# Patient Record
Sex: FEMALE | Race: WHITE | NOT HISPANIC OR LATINO | URBAN - METROPOLITAN AREA
[De-identification: names, ages, dates, MRNs, and addresses within clinical notes are randomized per-mention and may not be internally consistent; named-entity substitution may affect disease eponyms.]

---

## 2019-07-07 ENCOUNTER — EMERGENCY (EMERGENCY)
Facility: HOSPITAL | Age: 54
LOS: 1 days | Discharge: HOME | End: 2019-07-07
Attending: EMERGENCY MEDICINE | Admitting: EMERGENCY MEDICINE
Payer: COMMERCIAL

## 2019-07-07 VITALS
SYSTOLIC BLOOD PRESSURE: 137 MMHG | OXYGEN SATURATION: 97 % | RESPIRATION RATE: 16 BRPM | TEMPERATURE: 98 F | DIASTOLIC BLOOD PRESSURE: 96 MMHG | HEART RATE: 71 BPM

## 2019-07-07 VITALS
OXYGEN SATURATION: 98 % | DIASTOLIC BLOOD PRESSURE: 94 MMHG | HEART RATE: 94 BPM | RESPIRATION RATE: 16 BRPM | TEMPERATURE: 99 F | SYSTOLIC BLOOD PRESSURE: 152 MMHG | HEIGHT: 58 IN | WEIGHT: 128.09 LBS

## 2019-07-07 PROCEDURE — 73562 X-RAY EXAM OF KNEE 3: CPT

## 2019-07-07 PROCEDURE — 99283 EMERGENCY DEPT VISIT LOW MDM: CPT

## 2019-07-07 PROCEDURE — 73562 X-RAY EXAM OF KNEE 3: CPT | Mod: 26,RT

## 2019-07-07 PROCEDURE — 96372 THER/PROPH/DIAG INJ SC/IM: CPT

## 2019-07-07 PROCEDURE — 99284 EMERGENCY DEPT VISIT MOD MDM: CPT | Mod: 25

## 2019-07-07 RX ORDER — METOPROLOL TARTRATE 50 MG
1 TABLET ORAL
Qty: 0 | Refills: 0 | DISCHARGE

## 2019-07-07 RX ORDER — CLONAZEPAM 1 MG
1 TABLET ORAL
Qty: 0 | Refills: 0 | DISCHARGE

## 2019-07-07 RX ORDER — VORTIOXETINE 5 MG/1
0 TABLET, FILM COATED ORAL
Qty: 0 | Refills: 0 | DISCHARGE

## 2019-07-07 RX ORDER — AMLODIPINE BESYLATE 2.5 MG/1
1 TABLET ORAL
Qty: 0 | Refills: 0 | DISCHARGE

## 2019-07-07 RX ORDER — VENLAFAXINE HCL 75 MG
1 CAPSULE, EXT RELEASE 24 HR ORAL
Qty: 0 | Refills: 0 | DISCHARGE

## 2019-07-07 RX ORDER — LEVOTHYROXINE SODIUM 125 MCG
1 TABLET ORAL
Qty: 0 | Refills: 0 | DISCHARGE

## 2019-07-07 RX ORDER — TRAMADOL HYDROCHLORIDE 50 MG/1
1 TABLET ORAL
Qty: 9 | Refills: 0
Start: 2019-07-07 | End: 2019-07-09

## 2019-07-07 RX ORDER — KETOROLAC TROMETHAMINE 30 MG/ML
60 SYRINGE (ML) INJECTION ONCE
Refills: 0 | Status: DISCONTINUED | OUTPATIENT
Start: 2019-07-07 | End: 2019-07-07

## 2019-07-07 RX ADMIN — Medication 60 MILLIGRAM(S): at 13:31

## 2019-07-07 NOTE — ED ADULT NURSE NOTE - OBJECTIVE STATEMENT
Received pt in bed alert and oriented x4.  Patient states she was playing volley ball last night and another person collided with her and she heard a "crack" on her knee right side. Slight swelling noted to right side of right knee.   Pain reported 9/10. Ongoing nursing care and safety maintained. Received pt in bed alert and oriented x4.  Patient states she was playing volley ball last night and another person collided with her and she heard a "crack" on her knee right side. Slight swelling noted to right side of right knee.   Pain reported 9/10. X ray pending.  Ongoing nursing care and safety maintained.

## 2019-07-07 NOTE — ED ADULT NURSE NOTE - NSIMPLEMENTINTERV_GEN_ALL_ED
Implemented All Fall with Harm Risk Interventions:  [place] to call system. Call bell, personal items and telephone within reach. Instruct patient to call for assistance. Room bathroom lighting operational. Non-slip footwear when patient is off stretcher. Physically safe environment: no spills, clutter or unnecessary equipment. Stretcher in lowest position, wheels locked, appropriate side rails in place. Provide visual cue, wrist band, yellow gown, etc. Monitor gait and stability. Monitor for mental status changes and reorient to person, place, and time. Review medications for side effects contributing to fall risk. Reinforce activity limits and safety measures with patient and family. Provide visual clues: red socks.

## 2019-07-07 NOTE — ED PROVIDER NOTE - OBJECTIVE STATEMENT
53 y/o female presents to ED c/o right knee pain s/p trauma while playing volleyball last night. States she accidentally hit her knee into another player. Denies fall. Rates pain 8/10, worse with movement, no radiation of pain, sudden onset. Denies any other complaints. States she otherwise feels good. Denies n/v, f/c, chest pain, sob, numbness, tingling, recent traveling.

## 2019-07-07 NOTE — ED PROVIDER NOTE - ATTENDING CONTRIBUTION TO CARE
Pt is a 53 yo female who presents to the ED with a cc of right knee pain.  PMHx of HTN, hypothyroidism.  Pt reports that last night she was playing in a volleyball game.  She reports that she dove for a ball and another player crashed into her right knee.  She reports that she thought initially that she dislocated her patellar and that it spontaneously reduced.  She has been experiencing knee pain since with increased swelling.  Pain increased with weight bearing.  Denies numbness or tingling in ext.  Denies previous injury to knee.  On exam pt lying in bed NAD, heart RRR, lungs CTA, abd soft NT/ND.  Right knee: diffuse swelling noted anteriorly with TTP to upper lateral patellar region, FROM but reports pain on movement, sensation grossly intact, +pedal pulse.  Will obtain x-ray, high suspicion for ligament, meniscus injury advised on need for MRI outpatient.  Agree with above plan of care

## 2019-07-07 NOTE — ED ADULT NURSE NOTE - CAS EDN DISCHARGE ASSESSMENT
Alert and oriented to person, place and time/knee immobilizer in place and crutches given to patient. per MD orders

## 2019-07-07 NOTE — ED PROVIDER NOTE - CHPI ED SYMPTOMS NEG
no weakness/no tingling/no numbness/no back pain/no abrasion/no stiffness/no deformity/no bruising/no fever

## 2019-07-07 NOTE — ED PROVIDER NOTE - CLINICAL SUMMARY MEDICAL DECISION MAKING FREE TEXT BOX
Please follow up with your Primary MD in 24-48 hr. Please follow up with orthopedics Dr Gonzalez within 3 days- call tomorrow morning for an appointment. Keep knee immobilizer clean, dry and intact. Rest, ice, elevate extremity. OTC motrin every 6 hours as needed for pain, take with food. Percocet every 8 hours as needed for pain, do not drive or drink alcohol while taking this medication. Return to ED immediately if condition worsens or any concerns.  Seek immediate medical care for any new/worsening signs or symptoms. xray, pain management, reeval, f/u ortho  Please follow up with your Primary MD in 24-48 hr. Please follow up with orthopedics Dr Gonzalez within 3 days- call tomorrow morning for an appointment. Keep knee immobilizer clean, dry and intact. Rest, ice, elevate extremity. OTC motrin every 6 hours as needed for pain, take with food. Percocet every 8 hours as needed for pain, do not drive or drink alcohol while taking this medication. Return to ED immediately if condition worsens or any concerns.  Seek immediate medical care for any new/worsening signs or symptoms. xray, pain management, reeval, f/u ortho  Please follow up with your Primary MD in 24-48 hr. Please follow up with orthopedics Dr Gonzalez within 3 days- call tomorrow morning for an appointment. Keep knee immobilizer clean, dry and intact. Rest, ice, elevate extremity. OTC motrin every 6 hours as needed for pain, take with food. Return to ED immediately if condition worsens or any concerns.  Seek immediate medical care for any new/worsening signs or symptoms.

## 2020-04-08 PROBLEM — E03.9 HYPOTHYROIDISM, UNSPECIFIED: Chronic | Status: ACTIVE | Noted: 2019-07-07

## 2020-04-08 PROBLEM — I10 ESSENTIAL (PRIMARY) HYPERTENSION: Chronic | Status: ACTIVE | Noted: 2019-07-07

## 2020-04-13 ENCOUNTER — APPOINTMENT (OUTPATIENT)
Dept: ENDOCRINOLOGY | Facility: CLINIC | Age: 55
End: 2020-04-13
Payer: COMMERCIAL

## 2020-04-13 PROCEDURE — 99205 OFFICE O/P NEW HI 60 MIN: CPT | Mod: 95

## 2020-04-13 NOTE — REASON FOR VISIT
[Consultation] : a consultation visit [Hypothyroidism] : hypothyroidism [Thyroid nodule/ MNG] : thyroid nodule/ MNG [Pituitary Evaluation/ Disorder] : pituitary evaluation/disorder

## 2020-04-13 NOTE — CONSULT LETTER
[Dear  ___] : Dear  [unfilled], [Sincerely,] : Sincerely, [FreeTextEntry1] : Thank you for referring  Ms. PARESH FIGUEROA to me for evaluation and treatment. Please, see attached consultation note. As always, if there are specific questions you would like to discuss, please feel free to contact me.\par Thank you for the courtesy of this evaluation.\par  [FreeTextEntry3] : Fiona Conner MD, FACE, ECNU\par

## 2020-04-13 NOTE — ASSESSMENT
[Levothyroxine] : The patient was instructed to take Levothyroxine on an empty stomach, separate from vitamins, and wait at least 30 minutes before eating [FreeTextEntry1] : - repeat am cortisol, ACTH, E2, gonadotropins, fasting IGF-1 levels\par - advised to forward the report of pituitary MRI once it's available\par - in regards of hypothyroidism, she appears to be biochemically euthyroid and can continue present dose of L-thyroxine 150 mcg\par - I've advised to obtain the pathology report form her surgery, as well s repeating a thyroid US\par Proper intake of levothyroxine is reviewed in details, including on an empty stomach, with water only, at least one hour before taking any medications, vitamins, or supplements and three-four hours before taking iron or calcium.\par \par RTC 3-4 weeks (tele)

## 2020-04-13 NOTE — HISTORY OF PRESENT ILLNESS
[Home] : at home, [unfilled] , at the time of the visit. [Medical Office: ([facility])___] : at ~his/her~ medical office located in [place] [Spouse] : spouse [Patient] : the patient [Self] : self [FreeTextEntry1] : 55 year female  referred for pituitary evaluation, as well as the  management of thyroid nodules and hypothyroidism.\par Mrs Farfan was evaluated for a persistent headache lasting for about a month. She had 2 visits to ER and apparently CT scan of the head was negative. She was subsequently seen by Dr. Francisco and MRI of the brain was also negative. She's scheduled for an MRI of the pituitary gland today.  \par She's been also diagnosed with Hashimoto's thyroiditis and has been on a stable dose of L-thyroxine 150 mcg. She also underwent a partial left hemithyroidectomy about 4-5 years ago for a presumably precancerous lesion.\par Denies history of neck surgery or radiation exposure to the neck area other than radiologic studies. \par Family history is significant for undefined thyroid problems on her mother's side.\par Currently, there are no local neck symptoms of anterior neck pain or problems with breathing, speaking, or swallowing. \par Patient denies symptoms of hypothyroidism or hyperthyroidism. \par She's been also treated for uncontrolled HTN with Edarbyclor 40/12.5 and Clonidine prn. She reports an aldosterone checked in the past which was normal, and elevated renin which was attributed to her blood pressure medications. She's seen a nephrologist for that. \par She reports a history of breast cancer (P+, E+), s/p chemotherapy but no radiation therapy. She took Tamoxifen for 5 years. \par Labs: late pm cortisol 5.7, ACTH- 8.4, TSH-  1.64, FT4- 1.5, LH- 0.4, FSH- 15.1, GH- 0.08 (0.12-9.88) , IGF-1- 53, prolactin- 7.1\par \par MRI brain (3/24/20)- T2 hyperintensities in a pattern seen in association with vasospastic phenomena\par FNA (10/28/14) of the  LLP 1.0 cm thyroid nodule-  no path report is available\par Thyroid US (10/9/14)- LMP 0.9x0.5x0.7 echogenic nodule\par \par

## 2020-05-01 LAB
ACTH SER-ACNC: 7 PG/ML
CORTIS SERPL-MCNC: 5.2 UG/DL
ESTRADIOL SERPL-MCNC: 12 PG/ML
FSH SERPL-MCNC: 16.5 IU/L
GH SERPL-MCNC: 0.17 NG/ML
IGF-1 INTERP: NORMAL
IGF-I BLD-MCNC: 88 NG/ML
LH SERPL-ACNC: 1 IU/L
PROLACTIN SERPL-MCNC: 19.7 NG/ML
T4 FREE SERPL-MCNC: 1.7 NG/DL
THYROGLOB AB SERPL-ACNC: <20 IU/ML
THYROPEROXIDASE AB SERPL IA-ACNC: 599 IU/ML
TSH SERPL-ACNC: 0.11 UIU/ML

## 2020-05-04 LAB — ALPHA SUBUNIT SERPL-MCNC: 1.2 NG/ML

## 2020-06-18 LAB
ACTH SER-ACNC: <1.5 PG/ML
CORTIS SERPL-MCNC: 0.6 UG/DL

## 2020-08-24 LAB — CORTIS SERPL-MCNC: 10.2 UG/DL

## 2020-09-29 ENCOUNTER — APPOINTMENT (OUTPATIENT)
Dept: ENDOCRINOLOGY | Facility: CLINIC | Age: 55
End: 2020-09-29

## 2020-10-12 ENCOUNTER — APPOINTMENT (OUTPATIENT)
Dept: ENDOCRINOLOGY | Facility: CLINIC | Age: 55
End: 2020-10-12
Payer: COMMERCIAL

## 2020-10-12 VITALS
RESPIRATION RATE: 17 BRPM | HEIGHT: 58 IN | WEIGHT: 139 LBS | OXYGEN SATURATION: 98 % | SYSTOLIC BLOOD PRESSURE: 130 MMHG | DIASTOLIC BLOOD PRESSURE: 80 MMHG | TEMPERATURE: 98.7 F | HEART RATE: 75 BPM | BODY MASS INDEX: 29.18 KG/M2

## 2020-10-12 PROCEDURE — 99214 OFFICE O/P EST MOD 30 MIN: CPT | Mod: 25

## 2020-10-12 PROCEDURE — 36415 COLL VENOUS BLD VENIPUNCTURE: CPT

## 2020-10-12 NOTE — HISTORY OF PRESENT ILLNESS
[FreeTextEntry1] : 55 year female  f/u for pituitary evaluation, and  management of thyroid nodules and hypothyroidism.\par \par *** Oct 12, 2020 ***\par \par on L-thyroxine 137 mcg\par rpt am cortisol- 10.2  (once off steroids)\par \par prior to that , received 7 injections of triamcinolone\par Thyroid US (5/26/20)- s/p left hemithyroidectomy. no nodules in the right thyroid\par \par Pit MRI (4/13/20)- no pit masses\par \par HPI:\par Mrs Farfan was evaluated for a persistent headache lasting for about a month. She had 2 visits to ER and apparently CT scan of the head was negative. She was subsequently seen by Dr. Francisco and MRI of the brain was also negative. She's scheduled for an MRI of the pituitary gland today.  \par She's been also diagnosed with Hashimoto's thyroiditis and has been on a stable dose of L-thyroxine 150 mcg. She also underwent a partial left hemithyroidectomy about 4-5 years ago for a presumably precancerous lesion.\par Denies history of neck surgery or radiation exposure to the neck area other than radiologic studies. \par Family history is significant for undefined thyroid problems on her mother's side.\par Currently, there are no local neck symptoms of anterior neck pain or problems with breathing, speaking, or swallowing. \par Patient denies symptoms of hypothyroidism or hyperthyroidism. \par She's been also treated for uncontrolled HTN with Edarbyclor 40/12.5 and Clonidine prn. She reports an aldosterone checked in the past which was normal, and elevated renin which was attributed to her blood pressure medications. She's seen a nephrologist for that. \par She reports a history of breast cancer (P+, E+), s/p chemotherapy but no radiation therapy. She took Tamoxifen for 5 years. \par Labs: late pm cortisol 5.7, ACTH- 8.4, TSH-  1.64, FT4- 1.5, LH- 0.4, FSH- 15.1, GH- 0.08 (0.12-9.88) , IGF-1- 53, prolactin- 7.1\par \par MRI brain (3/24/20)- T2 hyperintensities in a pattern seen in association with vasospastic phenomena\par FNA (10/28/14) of the  LLP 1.0 cm thyroid nodule-  no path report is available\par Thyroid US (10/9/14)- LMP 0.9x0.5x0.7 echogenic nodule\par \par

## 2020-10-12 NOTE — ASSESSMENT
[Levothyroxine] : The patient was instructed to take Levothyroxine on an empty stomach, separate from vitamins, and wait at least 30 minutes before eating [FreeTextEntry1] : - no  pit masses seen on the last MRI\par - I've also discussed with the patient, that epidural triamcinolone supressed HPA axis within a week with gradual recovering over 4-12 wks. She seems to recover completely, and we'll reassess clinically\par - in regards of hypothyroidism, she appears to be clinically euthyroid and can continue present dose of L-thyroxine 137 mcg until the results a re back\par - I've advised to obtain the pathology report form her surgery. Will repeat thyroid US PRN\par Proper intake of levothyroxine is reviewed in details, including on an empty stomach, with water only, at least one hour before taking any medications, vitamins, or supplements and three-four hours before taking iron or calcium.\par \par RTC 3-6 mos

## 2020-10-12 NOTE — REASON FOR VISIT
[Follow - Up] : a follow-up visit [Hypothyroidism] : hypothyroidism [Pituitary Evaluation/ Disorder] : pituitary evaluation/disorder [Thyroid nodule/ MNG] : thyroid nodule/ MNG

## 2020-10-13 LAB
T4 FREE SERPL-MCNC: 1.2 NG/DL
TSH SERPL-ACNC: 0.98 UIU/ML

## 2021-01-14 ENCOUNTER — APPOINTMENT (OUTPATIENT)
Dept: ENDOCRINOLOGY | Facility: CLINIC | Age: 56
End: 2021-01-14
Payer: COMMERCIAL

## 2021-01-14 VITALS
DIASTOLIC BLOOD PRESSURE: 60 MMHG | WEIGHT: 131 LBS | BODY MASS INDEX: 27.5 KG/M2 | HEIGHT: 58 IN | OXYGEN SATURATION: 98 % | RESPIRATION RATE: 15 BRPM | SYSTOLIC BLOOD PRESSURE: 96 MMHG | TEMPERATURE: 98.5 F | HEART RATE: 86 BPM

## 2021-01-14 PROCEDURE — 99214 OFFICE O/P EST MOD 30 MIN: CPT

## 2021-01-14 PROCEDURE — 99072 ADDL SUPL MATRL&STAF TM PHE: CPT

## 2021-01-14 NOTE — ASSESSMENT
[Levothyroxine] : The patient was instructed to take Levothyroxine on an empty stomach, separate from vitamins, and wait at least 30 minutes before eating [FreeTextEntry1] : - no  pit masses seen on the last MRI\par - I've also discussed with the patient, that epidural triamcinolone suppressed HPA axis within a week with gradual recovering over 4-12 wks. She seems to recover completely, and we'll reassess clinically\par - in regards of hypothyroidism, she appears to be clinically euthyroid and can continue present dose of L-thyroxine 137 mcg until the results a re back\par - I've advised to obtain the pathology report form her surgery. Will repeat thyroid US PRN\par - screen for endocrine HTN\par If labs are fine, can f/u in 6 mos

## 2021-01-14 NOTE — HISTORY OF PRESENT ILLNESS
[FreeTextEntry1] : 55 year female  f/u for pituitary evaluation, and  management of thyroid nodules and hypothyroidism.\par \par *** Jan 14, 2021 ***\par \par seeing Dr. Francisco for migraines\par still with fatigue, thinning hair. admitted in December for hypertensive urgency with SBP > 220\par on L-thyroxine 137 mcg\par taking labetalol 200 mg bid, clonidine, hydralazine 50 gm bid. See. cardio at [facility]\par \par *** Oct 12, 2020 ***\par \par on L-thyroxine 137 mcg\par rpt am cortisol- 10.2  (once off steroids)\par \par prior to that , received 7 injections of triamcinolone\par Thyroid US (5/26/20)- s/p left hemithyroidectomy. no nodules in the right thyroid\par \par Pit MRI (4/13/20)- no pit masses\par \par HPI:\par Mrs Farfan was evaluated for a persistent headache lasting for about a month. She had 2 visits to ER and apparently CT scan of the head was negative. She was subsequently seen by Dr. Francisco and MRI of the brain was also negative. She's scheduled for an MRI of the pituitary gland today.  \par She's been also diagnosed with Hashimoto's thyroiditis and has been on a stable dose of L-thyroxine 150 mcg. She also underwent a partial left hemithyroidectomy about 4-5 years ago for a presumably precancerous lesion.\par Denies history of neck surgery or radiation exposure to the neck area other than radiologic studies. \par Family history is significant for undefined thyroid problems on her mother's side.\par Currently, there are no local neck symptoms of anterior neck pain or problems with breathing, speaking, or swallowing. \par Patient denies symptoms of hypothyroidism or hyperthyroidism. \par She's been also treated for uncontrolled HTN with Edarbyclor 40/12.5 and Clonidine prn. She reports an aldosterone checked in the past which was normal, and elevated renin which was attributed to her blood pressure medications. She's seen a nephrologist for that. \par She reports a history of breast cancer (P+, E+), s/p chemotherapy but no radiation therapy. She took Tamoxifen for 5 years. \par Labs: late pm cortisol 5.7, ACTH- 8.4, TSH-  1.64, FT4- 1.5, LH- 0.4, FSH- 15.1, GH- 0.08 (0.12-9.88) , IGF-1- 53, prolactin- 7.1\par \par MRI brain (3/24/20)- T2 hyperintensities in a pattern seen in association with vasospastic phenomena\par FNA (10/28/14) of the  LLP 1.0 cm thyroid nodule-  no path report is available\par Thyroid US (10/9/14)- LMP 0.9x0.5x0.7 echogenic nodule\par \par

## 2021-02-19 LAB
25(OH)D3 SERPL-MCNC: 39.4 NG/ML
ALDOSTERONE SERUM: 20.6 NG/DL
ANION GAP SERPL CALC-SCNC: 13 MMOL/L
BUN SERPL-MCNC: 14 MG/DL
CALCIUM SERPL-MCNC: 9.7 MG/DL
CHLORIDE SERPL-SCNC: 102 MMOL/L
CO2 SERPL-SCNC: 22 MMOL/L
CREAT SERPL-MCNC: 0.65 MG/DL
ESTIMATED AVERAGE GLUCOSE: 123 MG/DL
GLUCOSE SERPL-MCNC: 119 MG/DL
HBA1C MFR BLD HPLC: 5.9 %
POTASSIUM SERPL-SCNC: 4.1 MMOL/L
SODIUM SERPL-SCNC: 137 MMOL/L
T4 FREE SERPL-MCNC: 1.3 NG/DL
TSH SERPL-ACNC: 0.43 UIU/ML

## 2021-02-23 LAB
METANEPHRINE, PL: 20.2 PG/ML
NORMETANEPHRINE, PL: 78 PG/ML

## 2021-03-26 LAB — RENIN ACTIVITY, PLASMA: 0.86 NG/ML/HR

## 2021-04-16 LAB
CREAT 24H UR-MCNC: 0.7 G/24 H
CREAT ?TM UR-MCNC: 30 MG/DL
PROT ?TM UR-MCNC: 24 HR
SPECIMEN VOL 24H UR: 2350 ML

## 2021-05-03 ENCOUNTER — APPOINTMENT (OUTPATIENT)
Dept: ENDOCRINOLOGY | Facility: CLINIC | Age: 56
End: 2021-05-03
Payer: COMMERCIAL

## 2021-05-03 VITALS
SYSTOLIC BLOOD PRESSURE: 140 MMHG | DIASTOLIC BLOOD PRESSURE: 60 MMHG | WEIGHT: 132 LBS | TEMPERATURE: 98 F | BODY MASS INDEX: 27.71 KG/M2 | HEIGHT: 58 IN

## 2021-05-03 PROCEDURE — 99214 OFFICE O/P EST MOD 30 MIN: CPT

## 2021-05-03 NOTE — HISTORY OF PRESENT ILLNESS
[FreeTextEntry1] : 56 year female  f/u for pituitary evaluation, and  management of thyroid nodules and hypothyroidism.\par \par *** May 03, 2021 ***\par \par labs from 4/16/21- TSH- 0.1, FT4- 1.8\par 24h [employer]/ johan/meta- not run by the lab\par CT abd is not approved by insurance- awaiting confirmatory testing\par still on multiple antihypertensive meds with BP suboptimally controlled\par \par *** Jan 14, 2021 ***\par \par seeing Dr. Francisco for migraines\par still with fatigue, thinning hair. admitted in December for hypertensive urgency with SBP > 220\par on L-thyroxine 137 mcg\par taking labetalol 200 mg bid, clonidine, hydralazine 50 gm bid. See. cardio at [facility]\par \par *** Oct 12, 2020 ***\par \par on L-thyroxine 137 mcg\par rpt am cortisol- 10.2  (once off steroids)\par \par prior to that , received 7 injections of triamcinolone\par Thyroid US (5/26/20)- s/p left hemithyroidectomy. no nodules in the right thyroid\par \par Pit MRI (4/13/20)- no pit masses\par \par HPI:\par Mrs Farfan was evaluated for a persistent headache lasting for about a month. She had 2 visits to ER and apparently CT scan of the head was negative. She was subsequently seen by Dr. Francisco and MRI of the brain was also negative. She's scheduled for an MRI of the pituitary gland today.  \par She's been also diagnosed with Hashimoto's thyroiditis and has been on a stable dose of L-thyroxine 150 mcg. She also underwent a partial left hemithyroidectomy about 4-5 years ago for a presumably precancerous lesion.\par Denies history of neck surgery or radiation exposure to the neck area other than radiologic studies. \par Family history is significant for undefined thyroid problems on her mother's side.\par Currently, there are no local neck symptoms of anterior neck pain or problems with breathing, speaking, or swallowing. \par Patient denies symptoms of hypothyroidism or hyperthyroidism. \par She's been also treated for uncontrolled HTN with Edarbyclor 40/12.5 and Clonidine prn. She reports an aldosterone checked in the past which was normal, and elevated renin which was attributed to her blood pressure medications. She's seen a nephrologist for that. \par She reports a history of breast cancer (P+, E+), s/p chemotherapy but no radiation therapy. She took Tamoxifen for 5 years. \par Labs: late pm cortisol 5.7, ACTH- 8.4, TSH-  1.64, FT4- 1.5, LH- 0.4, FSH- 15.1, GH- 0.08 (0.12-9.88) , IGF-1- 53, prolactin- 7.1\par \par MRI brain (3/24/20)- T2 hyperintensities in a pattern seen in association with vasospastic phenomena\par FNA (10/28/14) of the  LLP 1.0 cm thyroid nodule-  no path report is available\par Thyroid US (10/9/14)- LMP 0.9x0.5x0.7 echogenic nodule\par \par

## 2021-05-03 NOTE — REASON FOR VISIT
[Follow - Up] : a follow-up visit [Hypothyroidism] : hypothyroidism [Pituitary Evaluation/ Disorder] : pituitary evaluation/disorder [Thyroid nodule/ MNG] : thyroid nodule/ MNG [Adrenal Evaluation/Adrenal Disorder] : adrenal evaluation/adrenal disorder

## 2021-05-03 NOTE — ASSESSMENT
[Levothyroxine] : The patient was instructed to take Levothyroxine on an empty stomach, separate from vitamins, and wait at least 30 minutes before eating [FreeTextEntry1] : - no  pit masses seen on the last MRI\par - I've also discussed with the patient, that epidural triamcinolone suppressed HPA axis within a week with gradual recovering over 4-12 wks. She seems to recover completely, and we'll reassess clinically\par - in regards of hypothyroidism, decr  L-thyroxine 125 mcg \par - I've advised to obtain the pathology report form her surgery. Will repeat thyroid US PRN\par - needs 24 hrs [employer]/johan/meta and reapply for CT adrenals\par RTC post labs

## 2021-05-23 LAB
CREAT 24H UR-MCNC: 0.6 G/24 H
CREAT 24H UR-MCNC: 0.6 G/24 H
CREAT ?TM UR-MCNC: 31 MG/DL
CREAT ?TM UR-MCNC: 31 MG/DL
PROT ?TM UR-MCNC: 24 HR
PROT ?TM UR-MCNC: 24 HR
SODIUM 24H UR-SCNC: 48 MMOL/L
SODIUM 24H UR-SRATE: 91 MMOL/24HR
SPECIMEN VOL 24H UR: 1900 ML
SPECIMEN VOL 24H UR: 1900 ML

## 2021-05-27 LAB
CORTIS 24H UR-MCNC: 24 H
CORTIS 24H UR-MRATE: 8.9 MCG/24 H
METANEPH 24H UR-MRATE: 91 MCG/24 H
SPECIMEN VOL 24H UR: 1900 ML

## 2021-06-10 LAB
ALDOST 24H UR-MCNC: <4.75 UG/24 HR
SPIRONOLACTONE UR-MCNC: <2.5 UG/L

## 2021-09-10 ENCOUNTER — TRANSCRIPTION ENCOUNTER (OUTPATIENT)
Age: 56
End: 2021-09-10

## 2021-10-09 ENCOUNTER — TRANSCRIPTION ENCOUNTER (OUTPATIENT)
Age: 56
End: 2021-10-09

## 2021-11-15 ENCOUNTER — TRANSCRIPTION ENCOUNTER (OUTPATIENT)
Age: 56
End: 2021-11-15

## 2021-12-13 ENCOUNTER — RX RENEWAL (OUTPATIENT)
Age: 56
End: 2021-12-13

## 2021-12-20 ENCOUNTER — APPOINTMENT (OUTPATIENT)
Dept: ENDOCRINOLOGY | Facility: CLINIC | Age: 56
End: 2021-12-20
Payer: COMMERCIAL

## 2021-12-20 PROCEDURE — 99443: CPT

## 2021-12-20 NOTE — REASON FOR VISIT
[Follow - Up] : a follow-up visit [Adrenal Evaluation/Adrenal Disorder] : adrenal evaluation/adrenal disorder [Hypothyroidism] : hypothyroidism [Pituitary Evaluation/ Disorder] : pituitary evaluation/disorder [Thyroid nodule/ MNG] : thyroid nodule/ MNG

## 2021-12-20 NOTE — HISTORY OF PRESENT ILLNESS
[Home] : at home, [unfilled] , at the time of the visit. [Medical Office: ([facility])___] : at the medical office located in  [Verbal consent obtained from patient] : the patient, [unfilled] [FreeTextEntry1] : 56 year female  f/u for pituitary evaluation, and  management of thyroid nodules and hypothyroidism.\par \par *** Phone visit  Dec 20, 2021 ***\par \par working full time now, missed f/u appts\par plans to see a rheum for her arthritis. BP is still high- to see cardio at [facility]. still on labetalol, losartan, hydralazine\par no recent labs\par still on L-thyroxine 125 mcg. \par \par CT abd (7/20/21)- normal adrenals\par \par *** May 03, 2021 ***\par \par labs from 4/16/21- TSH- 0.1, FT4- 1.8\par 24h [employer]/ johan/meta- not run by the lab\par CT abd is not approved by insurance- awaiting confirmatory testing\par still on multiple antihypertensive meds with BP suboptimally controlled\par \par *** Jan 14, 2021 ***\par \par seeing Dr. Francisco for migraines\par still with fatigue, thinning hair. admitted in December for hypertensive urgency with SBP > 220\par on L-thyroxine 137 mcg\par taking labetalol 200 mg bid, clonidine, hydralazine 50 gm bid. See. cardio at [facility]\par \par *** Oct 12, 2020 ***\par \par on L-thyroxine 137 mcg\par rpt am cortisol- 10.2  (once off steroids)\par \par prior to that , received 7 injections of triamcinolone\par Thyroid US (5/26/20)- s/p left hemithyroidectomy. no nodules in the right thyroid\par \par Pit MRI (4/13/20)- no pit masses\par \par HPI:\par Mrs Farfan was evaluated for a persistent headache lasting for about a month. She had 2 visits to ER and apparently CT scan of the head was negative. She was subsequently seen by Dr. Francisco and MRI of the brain was also negative. She's scheduled for an MRI of the pituitary gland today.  \par She's been also diagnosed with Hashimoto's thyroiditis and has been on a stable dose of L-thyroxine 150 mcg. She also underwent a partial left hemithyroidectomy about 4-5 years ago for a presumably precancerous lesion.\par Denies history of neck surgery or radiation exposure to the neck area other than radiologic studies. \par Family history is significant for undefined thyroid problems on her mother's side.\par Currently, there are no local neck symptoms of anterior neck pain or problems with breathing, speaking, or swallowing. \par Patient denies symptoms of hypothyroidism or hyperthyroidism. \par She's been also treated for uncontrolled HTN with Edarbyclor 40/12.5 and Clonidine prn. She reports an aldosterone checked in the past which was normal, and elevated renin which was attributed to her blood pressure medications. She's seen a nephrologist for that. \par She reports a history of breast cancer (P+, E+), s/p chemotherapy but no radiation therapy. She took Tamoxifen for 5 years. \par Labs: late pm cortisol 5.7, ACTH- 8.4, TSH-  1.64, FT4- 1.5, LH- 0.4, FSH- 15.1, GH- 0.08 (0.12-9.88) , IGF-1- 53, prolactin- 7.1\par \par MRI brain (3/24/20)- T2 hyperintensities in a pattern seen in association with vasospastic phenomena\par FNA (10/28/14) of the  LLP 1.0 cm thyroid nodule-  no path report is available\par Thyroid US (10/9/14)- LMP 0.9x0.5x0.7 echogenic nodule\par \par

## 2021-12-20 NOTE — ASSESSMENT
[Levothyroxine] : The patient was instructed to take Levothyroxine on an empty stomach, separate from vitamins, and wait at least 30 minutes before eating [FreeTextEntry1] : - no  pit masses seen on the last MRI\par - I've also discussed with the patient, that epidural triamcinolone suppressed HPA axis within a week with gradual recovering over 4-12 wks. She seems to recover completely, and we'll reassess clinically\par - in regards of hypothyroidism, cont L-thyroxine 125 mcg \par - I've advised to obtain the pathology report form her surgery. Will repeat thyroid US PRN\par - CT adrenals is neg for adenoma. retest PAC/PRA and consider a trial of aldactone\par RTC post labs

## 2022-04-21 NOTE — ED PROVIDER NOTE - PMH
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

## 2022-06-14 ENCOUNTER — APPOINTMENT (OUTPATIENT)
Dept: ENDOCRINOLOGY | Facility: CLINIC | Age: 57
End: 2022-06-14
Payer: COMMERCIAL

## 2022-06-14 PROCEDURE — 99214 OFFICE O/P EST MOD 30 MIN: CPT | Mod: 95

## 2022-06-14 NOTE — ASSESSMENT
[Levothyroxine] : The patient was instructed to take Levothyroxine on an empty stomach, separate from vitamins, and wait at least 30 minutes before eating [FreeTextEntry1] : - no  pit masses seen on the last MRI\par - I've also discussed with the patient, that epidural triamcinolone suppressed HPA axis within a week with gradual recovering over 4-12 wks. She seems to recover completely, and we'll reassess clinically\par - in regards of hypothyroidism, cont L-thyroxine 125 mcg , pending labs\par - check thyroid panel and adjust the meds if needed\par - I've advised to obtain the pathology report form her surgery. Will repeat thyroid US PRN\par - CT adrenals is neg for adenoma. retest PAC/PRA prn and consider a trial of aldactone if needed\par \par RTC post labs

## 2022-06-14 NOTE — HISTORY OF PRESENT ILLNESS
[Home] : at home, [unfilled] , at the time of the visit. [Medical Office: ([facility])___] : at the medical office located in  [Verbal consent obtained from patient] : the patient, [unfilled] [FreeTextEntry1] : 57 year female  f/u for pituitary evaluation, and  management of thyroid nodules and hypothyroidism.\par \par *** Televisit  Jun 14, 2022 ***\par \par rescheduled today's visit to a TEB b/o the car issues\par with a worsening fatigue, hair loss recently, cold intolerance. using CPAP for her MINDY\par no labs available to me, but per patient was seen by a cardiologist and "TSH was elevated"\par remains on L-thyroxine 125 mcg\par \par 24h meta/UFC- normal\par \par *** Phone visit  Dec 20, 2021 ***\par \par working full time now, missed f/u appts\par plans to see a rheum for her arthritis. BP is still high- to see cardio at [facility]. still on labetalol, losartan, hydralazine\par no recent labs\par still on L-thyroxine 125 mcg. \par \par CT abd (7/20/21)- normal adrenals\par \par *** May 03, 2021 ***\par \par labs from 4/16/21- TSH- 0.1, FT4- 1.8\par 24h UFC/ johan/meta- not run by the lab\par CT abd is not approved by insurance- awaiting confirmatory testing\par still on multiple antihypertensive meds with BP suboptimally controlled\par \par *** Jan 14, 2021 ***\par \par seeing Dr. Francisco for migraines\par still with fatigue, thinning hair. admitted in December for hypertensive urgency with SBP > 220\par on L-thyroxine 137 mcg\par taking labetalol 200 mg bid, clonidine, hydralazine 50 gm bid. See. cardio at [facility]\par \par *** Oct 12, 2020 ***\par \par on L-thyroxine 137 mcg\par rpt am cortisol- 10.2  (once off steroids)\par \par prior to that , received 7 injections of triamcinolone\par Thyroid US (5/26/20)- s/p left hemithyroidectomy. no nodules in the right thyroid\par \par Pit MRI (4/13/20)- no pit masses\par \par HPI:\par Mrs Farfan was evaluated for a persistent headache lasting for about a month. She had 2 visits to ER and apparently CT scan of the head was negative. She was subsequently seen by Dr. Francisco and MRI of the brain was also negative. She's scheduled for an MRI of the pituitary gland today.  \par She's been also diagnosed with Hashimoto's thyroiditis and has been on a stable dose of L-thyroxine 150 mcg. She also underwent a partial left hemithyroidectomy about 4-5 years ago for a presumably precancerous lesion.\par Denies history of neck surgery or radiation exposure to the neck area other than radiologic studies. \par Family history is significant for undefined thyroid problems on her mother's side.\par Currently, there are no local neck symptoms of anterior neck pain or problems with breathing, speaking, or swallowing. \par Patient denies symptoms of hypothyroidism or hyperthyroidism. \par She's been also treated for uncontrolled HTN with Edarbyclor 40/12.5 and Clonidine prn. She reports an aldosterone checked in the past which was normal, and elevated renin which was attributed to her blood pressure medications. She's seen a nephrologist for that. \par She reports a history of breast cancer (P+, E+), s/p chemotherapy but no radiation therapy. She took Tamoxifen for 5 years. \par Labs: late pm cortisol 5.7, ACTH- 8.4, TSH-  1.64, FT4- 1.5, LH- 0.4, FSH- 15.1, GH- 0.08 (0.12-9.88) , IGF-1- 53, prolactin- 7.1\par \par MRI brain (3/24/20)- T2 hyperintensities in a pattern seen in association with vasospastic phenomena\par FNA (10/28/14) of the  LLP 1.0 cm thyroid nodule-  no path report is available\par Thyroid US (10/9/14)- LMP 0.9x0.5x0.7 echogenic nodule\par \par

## 2022-07-08 ENCOUNTER — NON-APPOINTMENT (OUTPATIENT)
Age: 57
End: 2022-07-08

## 2022-07-08 LAB
25(OH)D3 SERPL-MCNC: 31.9 NG/ML
ALBUMIN SERPL ELPH-MCNC: 4.4 G/DL
ALDOSTERONE SERUM: 11 NG/DL
ALP BLD-CCNC: 157 U/L
ALT SERPL-CCNC: 133 U/L
ANION GAP SERPL CALC-SCNC: 13 MMOL/L
AST SERPL-CCNC: 139 U/L
BILIRUB SERPL-MCNC: 0.2 MG/DL
BUN SERPL-MCNC: 10 MG/DL
CALCIUM SERPL-MCNC: 9.4 MG/DL
CHLORIDE SERPL-SCNC: 105 MMOL/L
CHOLEST SERPL-MCNC: 170 MG/DL
CO2 SERPL-SCNC: 20 MMOL/L
CREAT SERPL-MCNC: 0.58 MG/DL
EGFR: 107 ML/MIN/1.73M2
ESTIMATED AVERAGE GLUCOSE: 126 MG/DL
GLUCOSE SERPL-MCNC: 110 MG/DL
HBA1C MFR BLD HPLC: 6 %
HDLC SERPL-MCNC: 44 MG/DL
LDLC SERPL CALC-MCNC: 90 MG/DL
NONHDLC SERPL-MCNC: 126 MG/DL
POTASSIUM SERPL-SCNC: 4.3 MMOL/L
PROT SERPL-MCNC: 7.6 G/DL
RENIN ACTIVITY, PLASMA: 3.27 NG/ML/HR
SODIUM SERPL-SCNC: 138 MMOL/L
T4 FREE SERPL-MCNC: 1.3 NG/DL
THYROGLOB AB SERPL-ACNC: 167 IU/ML
THYROPEROXIDASE AB SERPL IA-ACNC: 357 IU/ML
TRIGL SERPL-MCNC: 178 MG/DL
TSH SERPL-ACNC: 7.39 UIU/ML

## 2022-08-16 ENCOUNTER — NON-APPOINTMENT (OUTPATIENT)
Age: 57
End: 2022-08-16

## 2022-09-02 ENCOUNTER — APPOINTMENT (OUTPATIENT)
Dept: ENDOCRINOLOGY | Facility: CLINIC | Age: 57
End: 2022-09-02

## 2022-09-02 NOTE — HISTORY OF PRESENT ILLNESS
[FreeTextEntry1] : This is a 56 yo female with history of Hashimoto thyroiditis, s/p left hemithyroidectomy, breast cancer s/p chemo and completed 5 yrs of Tamoxifen, prediabetes who presents for evaluation today\par \par She has h/o left hemithroidectomy for presumed suspcious nodule, has h/o Hashimoto thyroiditis. Maternal side of family- thyroid\par SHe had televisit with endocrine in June 2022, worsening fatgieu, hair loss, cold intolerance, and noted by cardiologist her TSH was elevated was on levothyroxine 125mcg. Labs were repeated, TSH was 7.39 and patient was advised to increase brand Unithroid 137mcg daily. Also noted 24hr metanephinres, and urinary free cortisol were normal\par Also noted no pit masses noted on last MRI. CT adrenal negative for adenoma\par Last A1c was 6.0%\par \par Seen by miguel in July 2022, advised to hold Pravachol\par

## 2022-10-11 ENCOUNTER — APPOINTMENT (OUTPATIENT)
Dept: ORTHOPEDIC SURGERY | Facility: CLINIC | Age: 57
End: 2022-10-11

## 2023-02-28 ENCOUNTER — NON-APPOINTMENT (OUTPATIENT)
Age: 58
End: 2023-02-28

## 2023-05-31 ENCOUNTER — APPOINTMENT (OUTPATIENT)
Dept: ENDOCRINOLOGY | Facility: CLINIC | Age: 58
End: 2023-05-31

## 2023-06-12 ENCOUNTER — RX RENEWAL (OUTPATIENT)
Age: 58
End: 2023-06-12

## 2023-06-21 ENCOUNTER — APPOINTMENT (OUTPATIENT)
Dept: ENDOCRINOLOGY | Facility: CLINIC | Age: 58
End: 2023-06-21

## 2023-07-13 ENCOUNTER — NON-APPOINTMENT (OUTPATIENT)
Age: 58
End: 2023-07-13

## 2023-08-16 ENCOUNTER — APPOINTMENT (OUTPATIENT)
Dept: ENDOCRINOLOGY | Facility: CLINIC | Age: 58
End: 2023-08-16
Payer: COMMERCIAL

## 2023-08-16 VITALS
DIASTOLIC BLOOD PRESSURE: 60 MMHG | SYSTOLIC BLOOD PRESSURE: 122 MMHG | RESPIRATION RATE: 16 BRPM | BODY MASS INDEX: 30.23 KG/M2 | HEART RATE: 62 BPM | HEIGHT: 58 IN | OXYGEN SATURATION: 98 % | WEIGHT: 144 LBS | TEMPERATURE: 97.6 F

## 2023-08-16 DIAGNOSIS — E06.3 AUTOIMMUNE THYROIDITIS: ICD-10-CM

## 2023-08-16 DIAGNOSIS — E23.7 DISORDER OF PITUITARY GLAND, UNSPECIFIED: ICD-10-CM

## 2023-08-16 PROCEDURE — 36415 COLL VENOUS BLD VENIPUNCTURE: CPT

## 2023-08-16 PROCEDURE — 99214 OFFICE O/P EST MOD 30 MIN: CPT | Mod: 25

## 2023-08-16 NOTE — HISTORY OF PRESENT ILLNESS
[FreeTextEntry1] : 58 year female  f/u for multiple medical issues  *** Aug 16, 2023 *** missed f/u visits feeling more tired, constant headaches, trouble sleeping, "brain fog" and feeling anxious, constipation seeing neurologist, reports normal brain MRI. scheduled for botox injections last labs from 5/23- TSH- 7.49 saw cardiologist, who increased  L-thyroxine to 150 mcg about a month ago.  Seeing psychiatrist- on Effexor and Klonopine  *** Televisit  Jun 14, 2022 ***  rescheduled today's visit to a TEB b/o the car issues with a worsening fatigue, hair loss recently, cold intolerance. using CPAP for her MINDY no labs available to me, but per patient was seen by a cardiologist and "TSH was elevated" remains on L-thyroxine 125 mcg  24h meta/UFC- normal  *** Phone visit  Dec 20, 2021 ***  working full time now, missed f/u appts plans to see a rheum for her arthritis. BP is still high- to see cardio at [facility]. still on labetalol, losartan, hydralazine no recent labs still on L-thyroxine 125 mcg.   CT abd (7/20/21)- normal adrenals  *** May 03, 2021 ***  labs from 4/16/21- TSH- 0.1, FT4- 1.8 24h UFC/ johan/meta- not run by the lab CT abd is not approved by insurance- awaiting confirmatory testing still on multiple antihypertensive meds with BP suboptimally controlled  *** Jan 14, 2021 ***  seeing Dr. Francisco for migraines still with fatigue, thinning hair. admitted in December for hypertensive urgency with SBP > 220 on L-thyroxine 137 mcg taking labetalol 200 mg bid, clonidine, hydralazine 50 gm bid. See. cardio at [facility]  *** Oct 12, 2020 ***  on L-thyroxine 137 mcg rpt am cortisol- 10.2  (once off steroids)  prior to that , received 7 injections of triamcinolone Thyroid US (5/26/20)- s/p left hemithyroidectomy. no nodules in the right thyroid  Pit MRI (4/13/20)- no pit masses  HPI: Mrs Farfan was evaluated for a persistent headache lasting for about a month. She had 2 visits to ER and apparently CT scan of the head was negative. She was subsequently seen by Dr. Francisco and MRI of the brain was also negative. She's scheduled for an MRI of the pituitary gland today.   She's been also diagnosed with Hashimoto's thyroiditis and has been on a stable dose of L-thyroxine 150 mcg. She also underwent a partial left hemithyroidectomy about 4-5 years ago for a presumably precancerous lesion. Denies history of neck surgery or radiation exposure to the neck area other than radiologic studies.  Family history is significant for undefined thyroid problems on her mother's side. Currently, there are no local neck symptoms of anterior neck pain or problems with breathing, speaking, or swallowing.  Patient denies symptoms of hypothyroidism or hyperthyroidism.  She's been also treated for uncontrolled HTN with Edarbyclor 40/12.5 and Clonidine prn. She reports an aldosterone checked in the past which was normal, and elevated renin which was attributed to her blood pressure medications. She's seen a nephrologist for that.  She reports a history of breast cancer (P+, E+), s/p chemotherapy but no radiation therapy. She took Tamoxifen for 5 years.  Labs: late pm cortisol 5.7, ACTH- 8.4, TSH-  1.64, FT4- 1.5, LH- 0.4, FSH- 15.1, GH- 0.08 (0.12-9.88) , IGF-1- 53, prolactin- 7.1  MRI brain (3/24/20)- T2 hyperintensities in a pattern seen in association with vasospastic phenomena FNA (10/28/14) of the  LLP 1.0 cm thyroid nodule-  no path report is available Thyroid US (10/9/14)- LMP 0.9x0.5x0.7 echogenic nodule

## 2023-08-16 NOTE — ASSESSMENT
[Levothyroxine] : The patient was instructed to take Levothyroxine on an empty stomach, separate from vitamins, and wait at least 30 minutes before eating [FreeTextEntry1] : 1. H/o abnormal cortisol levels - no  pit masses seen on the last MRI - I've also discussed with the patient, that epidural triamcinolone suppressed HPA axis within a week with gradual recovering over 4-12 wks. She seems to recover completely, and we'll reassess clinically  2. Hypothyroidism - repeat thyroid leves today and adjust medication - cont L-thyroxine 150 mcg , pending labs - consider  adding Cytomel  (R+B reviewed) Proper intake of levothyroxine is reviewed in details, including on an empty stomach, with water only, at least one hour before taking any medications, vitamins, or supplements and three-four hours before taking iron or calcium. - I've advised to obtain the pathology report form her surgery. Will repeat thyroid US PRN - CT adrenals is neg for adenoma. retest PAC/PRA  and consider a trial of aldactone if needed  RTC 3 months

## 2023-08-21 ENCOUNTER — APPOINTMENT (OUTPATIENT)
Dept: FAMILY MEDICINE | Facility: CLINIC | Age: 58
End: 2023-08-21
Payer: COMMERCIAL

## 2023-08-21 VITALS
OXYGEN SATURATION: 99 % | TEMPERATURE: 97.9 F | WEIGHT: 142.25 LBS | DIASTOLIC BLOOD PRESSURE: 74 MMHG | HEIGHT: 58 IN | HEART RATE: 89 BPM | SYSTOLIC BLOOD PRESSURE: 122 MMHG | BODY MASS INDEX: 29.86 KG/M2

## 2023-08-21 DIAGNOSIS — I10 ESSENTIAL (PRIMARY) HYPERTENSION: ICD-10-CM

## 2023-08-21 DIAGNOSIS — I51.3 INTRACARDIAC THROMBOSIS, NOT ELSEWHERE CLASSIFIED: ICD-10-CM

## 2023-08-21 DIAGNOSIS — Z87.891 PERSONAL HISTORY OF NICOTINE DEPENDENCE: ICD-10-CM

## 2023-08-21 LAB
ALBUMIN SERPL ELPH-MCNC: 4.4 G/DL
ALDOSTERONE SERUM: 4.6 NG/DL
ALP BLD-CCNC: 119 U/L
ALT SERPL-CCNC: 57 U/L
ANION GAP SERPL CALC-SCNC: 14 MMOL/L
AST SERPL-CCNC: 51 U/L
BILIRUB SERPL-MCNC: 0.2 MG/DL
BUN SERPL-MCNC: 14 MG/DL
CALCIUM SERPL-MCNC: 10.1 MG/DL
CHLORIDE SERPL-SCNC: 101 MMOL/L
CO2 SERPL-SCNC: 24 MMOL/L
CREAT SERPL-MCNC: 0.65 MG/DL
EGFR: 103 ML/MIN/1.73M2
ESTIMATED AVERAGE GLUCOSE: 123 MG/DL
GLUCOSE SERPL-MCNC: 94 MG/DL
HBA1C MFR BLD HPLC: 5.9 %
POTASSIUM SERPL-SCNC: 4.1 MMOL/L
PROT SERPL-MCNC: 7.2 G/DL
RENIN ACTIVITY, PLASMA: 8.33 NG/ML/HR
SODIUM SERPL-SCNC: 139 MMOL/L
T3 SERPL-MCNC: 144 NG/DL
T4 FREE SERPL-MCNC: 1.6 NG/DL
THYROGLOB AB SERPL-ACNC: <20 IU/ML
THYROPEROXIDASE AB SERPL IA-ACNC: 243 IU/ML
TSH SERPL-ACNC: 2.06 UIU/ML

## 2023-08-21 PROCEDURE — 99386 PREV VISIT NEW AGE 40-64: CPT | Mod: 25

## 2023-08-21 RX ORDER — GABAPENTIN 300 MG/1
300 CAPSULE ORAL
Refills: 0 | Status: ACTIVE | COMMUNITY
Start: 2023-08-21

## 2023-08-21 RX ORDER — HYDRALAZINE HYDROCHLORIDE 100 MG/1
100 TABLET ORAL
Refills: 0 | Status: ACTIVE | COMMUNITY
Start: 2023-08-21

## 2023-08-21 RX ORDER — LOSARTAN POTASSIUM 100 MG/1
100 TABLET, FILM COATED ORAL
Qty: 30 | Refills: 1 | Status: ACTIVE | COMMUNITY
Start: 2023-08-21

## 2023-08-21 RX ORDER — HYDRALAZINE HYDROCHLORIDE 100 MG/1
100 TABLET ORAL
Refills: 0 | Status: DISCONTINUED | COMMUNITY
Start: 2023-08-21 | End: 2023-08-21

## 2023-08-21 RX ORDER — VENLAFAXINE HYDROCHLORIDE 150 MG/1
150 CAPSULE, EXTENDED RELEASE ORAL
Refills: 0 | Status: ACTIVE | COMMUNITY
Start: 2023-08-21

## 2023-08-21 RX ORDER — LIDOCAINE 5% 700 MG/1
5 PATCH TOPICAL
Qty: 14 | Refills: 0 | Status: ACTIVE | COMMUNITY
Start: 2023-08-21 | End: 1900-01-01

## 2023-08-21 RX ORDER — CYCLOBENZAPRINE HYDROCHLORIDE 5 MG/1
5 TABLET, FILM COATED ORAL
Qty: 14 | Refills: 0 | Status: ACTIVE | COMMUNITY
Start: 2023-08-21 | End: 1900-01-01

## 2023-08-21 RX ORDER — CLONIDINE 0.3 MG/24H
0.3 PATCH, EXTENDED RELEASE TRANSDERMAL
Qty: 1 | Refills: 0 | Status: ACTIVE | COMMUNITY
Start: 2023-08-21

## 2023-08-21 RX ORDER — LABETALOL HYDROCHLORIDE 200 MG/1
200 TABLET, FILM COATED ORAL
Refills: 0 | Status: ACTIVE | COMMUNITY
Start: 2023-08-21

## 2023-08-21 NOTE — PAST MEDICAL HISTORY
[Perimenopausal] : perimenopausal [Total Preg ___] : G[unfilled] [Live Births ___] : P[unfilled]  [Full Term ___] : Full Term: [unfilled] [Premature ___] : Premature: [unfilled] [Abortions ___] : Abortions:[unfilled]

## 2023-08-21 NOTE — REVIEW OF SYSTEMS
[Fatigue] : fatigue [Joint Pain] : joint pain [Joint Stiffness] : joint stiffness [Back Pain] : back pain [Anxiety] : anxiety [Depression] : depression [Negative] : Heme/Lymph

## 2023-08-22 ENCOUNTER — NON-APPOINTMENT (OUTPATIENT)
Age: 58
End: 2023-08-22

## 2023-08-22 LAB
ALBUMIN SERPL ELPH-MCNC: 4.6 G/DL
ALP BLD-CCNC: 119 U/L
ALT SERPL-CCNC: 46 U/L
ANION GAP SERPL CALC-SCNC: 12 MMOL/L
AST SERPL-CCNC: 35 U/L
BILIRUB SERPL-MCNC: 0.2 MG/DL
BUN SERPL-MCNC: 12 MG/DL
CALCIUM SERPL-MCNC: 9.7 MG/DL
CHLORIDE SERPL-SCNC: 101 MMOL/L
CHOLEST SERPL-MCNC: 182 MG/DL
CO2 SERPL-SCNC: 27 MMOL/L
CREAT SERPL-MCNC: 0.51 MG/DL
EGFR: 109 ML/MIN/1.73M2
GLUCOSE SERPL-MCNC: 95 MG/DL
HDLC SERPL-MCNC: 45 MG/DL
LDLC SERPL CALC-MCNC: 109 MG/DL
NONHDLC SERPL-MCNC: 137 MG/DL
POTASSIUM SERPL-SCNC: 4.3 MMOL/L
PROT SERPL-MCNC: 7.4 G/DL
SODIUM SERPL-SCNC: 139 MMOL/L
TRIGL SERPL-MCNC: 155 MG/DL

## 2023-08-22 NOTE — PLAN
[FreeTextEntry1] : 1. HCM  - routine blood work, lipid, cbc/cmp/UA   - a1c, tsh checked in 8/16/2023 prediabetic  - EKG provided from [facility] 3/2023 - NSR no ischemic changes   2. former smoker  - start low dose CT scan screening   3. HTN  - continue current meds  - BP at goal  - follows cardio at [place]  4. LBP  - likely 2/2 to herniated discs  - rx for flexiril x 7 days and lidoderm patch sent to pharmacy  - to see ortho for fu   5. migraines  - follows neurology, pain management  - on gabapentin  - currently waiting for approval for botox injections   6. history opioid dependence - on bupenorphone  - follow up addiction psychiatry

## 2023-08-22 NOTE — HEALTH RISK ASSESSMENT
[Good] : ~his/her~ current health as good [Fair] :  ~his/her~ mood as fair [No] : No [1] : 2) Feeling down, depressed, or hopeless for several days (1) [Patient reported PAP Smear was normal] : Patient reported PAP Smear was normal [Patient reported colonoscopy was normal] : Patient reported colonoscopy was normal [HIV test declined] : HIV test declined [Hepatitis C test declined] : Hepatitis C test declined [None] : None [# of Members in Household ___] :  household currently consist of [unfilled] member(s) [Retired] : retired [] :  [# Of Children ___] : has [unfilled] children [Sexually Active] : sexually active [Feels Safe at Home] : Feels safe at home [Fully functional (bathing, dressing, toileting, transferring, walking, feeding)] : Fully functional (bathing, dressing, toileting, transferring, walking, feeding) [Former] : Former [20 or more] : 20 or more [< 15 Years] : < 15 Years [de-identified] : unable due to back  [[id number]] : 2 [High Risk Behavior] : no high risk behavior [Reports changes in hearing] : Reports no changes in hearing [Reports changes in vision] : Reports no changes in vision [MammogramComments] : breast sonograms  [PapSmearDate] : 08/22 [ColonoscopyDate] : 12/22

## 2023-08-22 NOTE — PHYSICAL EXAM
[Well Nourished] : well nourished [Well Developed] : well developed [Well-Appearing] : well-appearing [Normal Sclera/Conjunctiva] : normal sclera/conjunctiva [PERRL] : pupils equal round and reactive to light [EOMI] : extraocular movements intact [Normal Outer Ear/Nose] : the outer ears and nose were normal in appearance [Normal Oropharynx] : the oropharynx was normal [No JVD] : no jugular venous distention [No Lymphadenopathy] : no lymphadenopathy [Supple] : supple [Thyroid Normal, No Nodules] : the thyroid was normal and there were no nodules present [No Respiratory Distress] : no respiratory distress  [No Accessory Muscle Use] : no accessory muscle use [Clear to Auscultation] : lungs were clear to auscultation bilaterally [Normal Rate] : normal rate  [Regular Rhythm] : with a regular rhythm [Normal S1, S2] : normal S1 and S2 [No Murmur] : no murmur heard [No Carotid Bruits] : no carotid bruits [No Abdominal Bruit] : a ~M bruit was not heard ~T in the abdomen [No Varicosities] : no varicosities [Pedal Pulses Present] : the pedal pulses are present [No Edema] : there was no peripheral edema [No Palpable Aorta] : no palpable aorta [No Extremity Clubbing/Cyanosis] : no extremity clubbing/cyanosis [Soft] : abdomen soft [Non Tender] : non-tender [Non-distended] : non-distended [No Masses] : no abdominal mass palpated [No HSM] : no HSM [Normal Bowel Sounds] : normal bowel sounds [Normal Posterior Cervical Nodes] : no posterior cervical lymphadenopathy [Normal Anterior Cervical Nodes] : no anterior cervical lymphadenopathy [No CVA Tenderness] : no CVA  tenderness [No Spinal Tenderness] : no spinal tenderness [No Joint Swelling] : no joint swelling [Grossly Normal Strength/Tone] : grossly normal strength/tone [No Rash] : no rash [Coordination Grossly Intact] : coordination grossly intact [No Focal Deficits] : no focal deficits [Normal Gait] : normal gait [Deep Tendon Reflexes (DTR)] : deep tendon reflexes were 2+ and symmetric [Normal Affect] : the affect was normal [Normal Insight/Judgement] : insight and judgment were intact [de-identified] : in pain  [de-identified] : + left sided hypertoniciticy Lower back, tender to palpation throughout

## 2023-08-22 NOTE — HISTORY OF PRESENT ILLNESS
[de-identified] : 59 yo F PMHx Hypothyroidism, Depression, PTSD, History Opioid Dependence, Migraines, Chronic LBP 2/2 herniated discs, presenting for NP - CPE. today she c/o back spasm left side, no trauma.   Patient has chronic back pain, 2/2 to herniated discs. Sees pain management and orthopedics.  has tried PT, cortisone shots and epidurals. she takes flexiril, lidocaine patches on/off with some relief. will see ortho for follow up.  Patient also with hx of Left ventricular clot found on TTE last year. was placed on eliquis until MARYURI which revealed resolution of the clot for which she was taken off eliquis.  Patient has long standing history of PTSD for which she became opioid dependent. was placed on bupenorphone and tapered by pain management is now on 2 mg QD. she sees psychiatry.  Has history of breast cancer s/p bilateral masectomy with implants. gets sonograms every year.  history of migraines, patient is being evaluated for botox injections. has had MR Brain/MRA cervical spine.   meds:  bupenorphine 2 mg QD for opioid dependence - taking extra 1/2 for back pain - psychiatry  dr spring miller - tMJ migraines and headaches. waitng pain management - botox needs for migraines  neurology's - [facility] - zomiq -  colonoscopy - last kamran kimball - 2022 normal

## 2023-08-28 DIAGNOSIS — D64.9 ANEMIA, UNSPECIFIED: ICD-10-CM

## 2023-08-29 ENCOUNTER — APPOINTMENT (OUTPATIENT)
Dept: ORTHOPEDIC SURGERY | Facility: CLINIC | Age: 58
End: 2023-08-29
Payer: COMMERCIAL

## 2023-08-29 ENCOUNTER — NON-APPOINTMENT (OUTPATIENT)
Age: 58
End: 2023-08-29

## 2023-08-29 VITALS — BODY MASS INDEX: 30.23 KG/M2 | WEIGHT: 144 LBS | HEIGHT: 58 IN

## 2023-08-29 DIAGNOSIS — M50.30 OTHER CERVICAL DISC DEGENERATION, UNSPECIFIED CERVICAL REGION: ICD-10-CM

## 2023-08-29 DIAGNOSIS — G89.29 CERVICALGIA: ICD-10-CM

## 2023-08-29 DIAGNOSIS — I10 ESSENTIAL (PRIMARY) HYPERTENSION: ICD-10-CM

## 2023-08-29 DIAGNOSIS — M54.2 CERVICALGIA: ICD-10-CM

## 2023-08-29 DIAGNOSIS — E78.00 PURE HYPERCHOLESTEROLEMIA, UNSPECIFIED: ICD-10-CM

## 2023-08-29 PROCEDURE — 72170 X-RAY EXAM OF PELVIS: CPT

## 2023-08-29 PROCEDURE — 72040 X-RAY EXAM NECK SPINE 2-3 VW: CPT

## 2023-08-29 PROCEDURE — 99204 OFFICE O/P NEW MOD 45 MIN: CPT

## 2023-08-29 PROCEDURE — 72100 X-RAY EXAM L-S SPINE 2/3 VWS: CPT

## 2023-08-29 RX ORDER — LIDOCAINE 5% 700 MG/1
5 PATCH TOPICAL
Qty: 30 | Refills: 2 | Status: ACTIVE | COMMUNITY
Start: 2023-08-29 | End: 1900-01-01

## 2023-08-29 RX ORDER — METHOCARBAMOL 750 MG/1
750 TABLET, FILM COATED ORAL 3 TIMES DAILY
Qty: 90 | Refills: 0 | Status: ACTIVE | COMMUNITY
Start: 2023-08-29 | End: 1900-01-01

## 2023-08-29 NOTE — REVIEW OF SYSTEMS
"[facility] ICU Progress Note      Admit Date: 2/24/2019  ICU Day: 1    Resident(s): John Jefferson   Attending: JERED MANLEY/ Dr. De Los Santos    Date & Time:   2/25/2019   1:57 PM       Patient ID:    Name:             Chantale Donato   YOB: 1981  Age:                 37 y.o.  female   MRN:               [id number]    Diagnosis:  Right vocal cord weakness secondary to upper respiratory tract infection  Recent influenza URI      HPI:  \"37-year-old female with no significant past medical history who presented with shortness of breath and stridor.  Patient states 3 weeks ago she had a flu with symptoms of chills. fever and a cough.She tested influenza positive after she went to an ER.  She was given Tamiflu and Z-Ruiz.  She stated the symptoms resolved but she still had voice hoarseness.  About a week and half ago she went to her PCP because of persistent hoarseness.  She was given Z-Ruiz, medro dosepack, Tessalon Perles and over-the-counter cough suppressants which she states did not give relief.  She continued to have worsening hoarseness of her voice with shortness of breath shortness of breath and wheezing.  She denied any fever, chills, sore throat, chest pain, palpitations.     In the ED, she was afebrile, blood pressure was stable, she was bit tachycardic, ,RR 20.  Labs were significant for a lactate of 2.3, potassium 3.4,.  Chest x-ray did not show any acute pulmonary abnormality.  She received 1 dose of ceftriaxone 2 g and racemic Epi.  ENT was consulted in the ED, who noted that patient had right vocal cord weakness and recommended a 3-week tapering dose of oral steroids.  Patient is being admitted to the ICU for close monitoring of upper airway.\"     Consultants:  ENT   PMA    Interval Events:    -Expiratory stridor noted however improving compared to admission as per the patient.  -Completed IV methylprednisolone.  Start 3-week prednisone taper with Pepcid  -Robitussin for anti-tussive " effect  -Stable to be transferred to the medical floor    Physical Exam:  GEN: Well developed, AO x 4, in no apparent distress   HEENT: Atraumatic, normocephalic, oral mucosa is moist, no JVD, no cervical LAD  No pharyngeal erythema.  Upper airway stridor audible  CVS: S1 S2 present, R/R/R, no M/R/G  RS: Air entry equal bilaterally. No W/R/R noted upon auscultation  ABD: Soft, nontender, BS present in all quadrants  EXT: No pedal edema noted  NEUR: Patient is able to move all of her extremities. CN 2-12 are grossly intact         Respiratory:     Respiration: (!) 35, Pulse Oximetry: 98 %, O2 Daily Delivery Respiratory : Room Air with O2 Available    Chest Tube Drains:          HemoDynamics:  Pulse: 85, Heart Rate (Monitored): 83 Blood Pressure: 138/92, NIBP: 129/74      Neuro:      Fluids:    Date 19 0700 - 19 0659   Shift 8964-6920 6877-2103 4650-7774 24 Hour Total   I  N  T  A  K  E   P.O. 740   740      P.O. 740   740    Shift Total 740   740   O  U  T  P  U  T   Urine 0   0      Number of Times Voided 0 x   0 x      Urine Void (mL) 0   0    Shift Total 0   0      740        Intake/Output Summary (Last 24 hours) at 19 1357  Last data filed at 19 1000   Gross per 24 hour   Intake              940 ml   Output             1450 ml   Net             -510 ml       Weight: 92.7 kg (204 lb 5.9 oz)  Body mass index is 30.18 kg/m².    Recent Labs      19   1640  19   0549   SODIUM  134*  136   POTASSIUM  3.4*  3.9   CHLORIDE  107  110   CO2  21  21   BUN  15  17   CREATININE  0.75  0.56   CALCIUM  9.5  9.5       GI/Nutrition:  Recent Labs      19   1640  19   0549   ALTSGPT  13   --    ASTSGOT  15   --    ALKPHOSPHAT  65   --    TBILIRUBIN  0.3   --    GLUCOSE  181*  132*       Heme:  Recent Labs      19   1640  19   0549   RBC  4.42  4.33   HEMOGLOBIN  13.3  13.0   HEMATOCRIT  40.0  38.9   PLATELETCT  265  247       Infectious Disease:  Temp  Av.5 °C  (97.7 °F)  Min: 36.1 °C (97 °F)  Max: 36.9 °C (98.4 °F)  Recent Labs      02/24/19   1640  02/25/19   0549   WBC  7.8  10.1   NEUTSPOLYS  92.60*  90.40*   LYMPHOCYTES  5.90*  7.10*   MONOCYTES  1.20  1.80   EOSINOPHILS  0.00  0.10   BASOPHILS  0.00  0.10   ASTSGOT  15   --    ALTSGPT  13   --    ALKPHOSPHAT  65   --    TBILIRUBIN  0.3   --        Meds:  • famotidine  20 mg     • senna-docusate  2 Tab      And   • polyethylene glycol/lytes  1 Packet      And   • magnesium hydroxide  30 mL      And   • bisacodyl  10 mg     • Respiratory Care per Protocol       • enoxaparin  40 mg     • enalaprilat  1.25 mg     • guaiFENesin dextromethorphan  10 mL     • acetaminophen  650 mg     • predniSONE  40 mg      Followed by   • [START ON 3/4/2019] predniSONE  20 mg      Followed by   • [START ON 3/11/2019] predniSONE  10 mg          Problem and Plan:        Hypokalemia  - monitor Replete potassium    Stridor  Patient presented with Cough, shortness of breath, wheeze and stridor following an upper respiratory tract infection.  Received Z-Ruiz and Medrol Dosepak outpatient but worsened stridor was noted upon admission.  Vitals were stable on presentation.  Chest x-ray negative for any pulmonary abnormality.  On examination, she was noted to have upper airway stridor.  ENT, evaluated patient in the ED and noted a right vocal cord  weakness post upper respiratory tract infection.  Recommendations include steroid taper, and tight use of medications, and voice restrictions    -IV methylprednisolone for 24 hours received  -PO prednisone 40 mg x 7days, 20 mg x 7 days then 10mg x 7days per ENT recommendations.  -Pepcid started  -Robitussin cough syrup.  -Aspiration precautions.  -Okay to transfer to the medical floor          DISPO: Transfer to the medical floor    CODE STATUS: Full code    Quality Measures:  Stacy Catheter: None needed  DVT Prophylaxis: Subcutaneous Lovenox  Ulcer Prophylaxis: Pepcid  Antibiotics: None needed  Lines:  None    Procedures:  Laryngoscopy on 2/24/2019    Imaging:  DX-CHEST-PORTABLE (1 VIEW)   Final Result      No acute cardiac or pulmonary abnormalities are identified.      CT-FOREIGN FILM CAT SCAN   Final Result      OUTSIDE IMAGES-DX CHEST   Final Result               [Joint Pain] : joint pain [Negative] : Heme/Lymph

## 2023-08-29 NOTE — DATA REVIEWED
[MRI] : MRI [Cervical Spine] : cervical spine [I reviewed the films/CD and agree] : I reviewed the films/CD and agree [FreeTextEntry1] : straightening of lordosis, disc bulges, no stenosis

## 2023-08-29 NOTE — DISCUSSION/SUMMARY
[de-identified] : Needs MRI lumbar to evaluate for stenosis. PT ordered Methocarbamol and Lidoderm patch prescribed

## 2023-08-29 NOTE — HISTORY OF PRESENT ILLNESS
[Neck] : neck [Lower back] : lower back [Gradual] : gradual [10] : 10 [Dull/Aching] : dull/aching [Radiating] : radiating [Tightness] : tightness [Squeezing] : squeezing [Massage] : massage [Standing] : standing [Walking] : walking [Bending forward] : bending forward [Extending back] : extending back [Exercising] : exercising [Stairs] : stairs [de-identified] : Has long standing Low back pain radiating to hips. Has had Pain Management many years ago for LESIs. Not getting sciatica. Does home stretching, Lidoderm patches, ice/heat. Also neck pain with headaches and TMJ. No trauma [] : no [FreeTextEntry5] : 58yr old female neck and lower back had worsen since six months ago. Denies prior treatment. Denies specific injury/trauma. Received epidurals [FreeTextEntry7] : b/l hips [FreeTextEntry9] : stretching

## 2023-08-29 NOTE — PHYSICAL EXAM
[Extension] : extension [Straightening consistent with spasm] : Straightening consistent with spasm [] : negative sitting straight leg raise [Disc space narrowing] : Disc space narrowing [AP] : anteroposterior [There are no fractures, subluxations or dislocations. No significant abnormalities are seen] : There are no fractures, subluxations or dislocations. No significant abnormalities are seen

## 2023-09-02 ENCOUNTER — TRANSCRIPTION ENCOUNTER (OUTPATIENT)
Age: 58
End: 2023-09-02

## 2023-09-05 ENCOUNTER — APPOINTMENT (OUTPATIENT)
Dept: MRI IMAGING | Facility: CLINIC | Age: 58
End: 2023-09-05

## 2023-09-05 RX ORDER — DICLOFENAC SODIUM 75 MG/1
75 TABLET, DELAYED RELEASE ORAL
Qty: 60 | Refills: 2 | Status: ACTIVE | COMMUNITY
Start: 2023-09-05 | End: 1900-01-01

## 2023-09-12 ENCOUNTER — APPOINTMENT (OUTPATIENT)
Dept: ORTHOPEDIC SURGERY | Facility: CLINIC | Age: 58
End: 2023-09-12

## 2023-09-15 ENCOUNTER — APPOINTMENT (OUTPATIENT)
Dept: MRI IMAGING | Facility: CLINIC | Age: 58
End: 2023-09-15

## 2023-09-16 ENCOUNTER — APPOINTMENT (OUTPATIENT)
Dept: MRI IMAGING | Facility: CLINIC | Age: 58
End: 2023-09-16

## 2023-09-21 ENCOUNTER — APPOINTMENT (OUTPATIENT)
Dept: ORTHOPEDIC SURGERY | Facility: CLINIC | Age: 58
End: 2023-09-21

## 2023-09-27 ENCOUNTER — APPOINTMENT (OUTPATIENT)
Dept: MRI IMAGING | Facility: CLINIC | Age: 58
End: 2023-09-27
Payer: COMMERCIAL

## 2023-09-27 PROCEDURE — 72148 MRI LUMBAR SPINE W/O DYE: CPT

## 2023-09-29 ENCOUNTER — APPOINTMENT (OUTPATIENT)
Dept: ORTHOPEDIC SURGERY | Facility: CLINIC | Age: 58
End: 2023-09-29

## 2023-10-06 ENCOUNTER — APPOINTMENT (OUTPATIENT)
Dept: ORTHOPEDIC SURGERY | Facility: CLINIC | Age: 58
End: 2023-10-06
Payer: COMMERCIAL

## 2023-10-06 VITALS — HEIGHT: 58 IN | BODY MASS INDEX: 30.23 KG/M2 | WEIGHT: 144 LBS

## 2023-10-06 DIAGNOSIS — M54.50 LOW BACK PAIN, UNSPECIFIED: ICD-10-CM

## 2023-10-06 DIAGNOSIS — M51.36 OTHER INTERVERTEBRAL DISC DEGENERATION, LUMBAR REGION: ICD-10-CM

## 2023-10-06 DIAGNOSIS — M48.061 SPINAL STENOSIS, LUMBAR REGION WITHOUT NEUROGENIC CLAUDICATION: ICD-10-CM

## 2023-10-06 PROCEDURE — 20552 NJX 1/MLT TRIGGER POINT 1/2: CPT

## 2023-10-06 PROCEDURE — 99213 OFFICE O/P EST LOW 20 MIN: CPT | Mod: 25

## 2023-10-06 RX ORDER — KETOROLAC TROMETHAMINE 10 MG/1
10 TABLET, FILM COATED ORAL 3 TIMES DAILY
Qty: 20 | Refills: 0 | Status: ACTIVE | COMMUNITY
Start: 2023-10-06 | End: 1900-01-01

## 2023-10-09 ENCOUNTER — APPOINTMENT (OUTPATIENT)
Dept: FAMILY MEDICINE | Facility: CLINIC | Age: 58
End: 2023-10-09

## 2023-11-06 ENCOUNTER — APPOINTMENT (OUTPATIENT)
Dept: FAMILY MEDICINE | Facility: CLINIC | Age: 58
End: 2023-11-06

## 2023-11-08 DIAGNOSIS — D72.829 ELEVATED WHITE BLOOD CELL COUNT, UNSPECIFIED: ICD-10-CM

## 2023-11-10 ENCOUNTER — APPOINTMENT (OUTPATIENT)
Dept: PAIN MANAGEMENT | Facility: CLINIC | Age: 58
End: 2023-11-10

## 2023-11-13 ENCOUNTER — APPOINTMENT (OUTPATIENT)
Dept: FAMILY MEDICINE | Facility: CLINIC | Age: 58
End: 2023-11-13

## 2023-11-24 ENCOUNTER — APPOINTMENT (OUTPATIENT)
Dept: PAIN MANAGEMENT | Facility: CLINIC | Age: 58
End: 2023-11-24

## 2023-11-27 ENCOUNTER — TRANSCRIPTION ENCOUNTER (OUTPATIENT)
Age: 58
End: 2023-11-27

## 2023-11-27 LAB
BASOPHILS # BLD AUTO: 0.04 K/UL
BASOPHILS NFR BLD AUTO: 0.5 %
EOSINOPHIL # BLD AUTO: 0.2 K/UL
EOSINOPHIL NFR BLD AUTO: 2.5 %
HCT VFR BLD CALC: 36.5 %
HGB BLD-MCNC: 11 G/DL
IMM GRANULOCYTES NFR BLD AUTO: 0.5 %
LYMPHOCYTES # BLD AUTO: 3.1 K/UL
LYMPHOCYTES NFR BLD AUTO: 38 %
MAN DIFF?: NORMAL
MCHC RBC-ENTMCNC: 25 PG
MCHC RBC-ENTMCNC: 30.1 GM/DL
MCV RBC AUTO: 83 FL
MONOCYTES # BLD AUTO: 0.63 K/UL
MONOCYTES NFR BLD AUTO: 7.7 %
NEUTROPHILS # BLD AUTO: 4.15 K/UL
NEUTROPHILS NFR BLD AUTO: 50.8 %
PLATELET # BLD AUTO: 317 K/UL
RBC # BLD: 4.4 M/UL
RBC # FLD: 14.5 %
WBC # FLD AUTO: 8.16 K/UL

## 2023-11-28 LAB
ALBUMIN SERPL ELPH-MCNC: 4.4 G/DL
ALP BLD-CCNC: 126 U/L
ALT SERPL-CCNC: 41 U/L
ANION GAP SERPL CALC-SCNC: 13 MMOL/L
AST SERPL-CCNC: 35 U/L
BILIRUB SERPL-MCNC: 0.2 MG/DL
BUN SERPL-MCNC: 11 MG/DL
CALCIUM SERPL-MCNC: 10.9 MG/DL
CHLORIDE SERPL-SCNC: 99 MMOL/L
CHOLEST SERPL-MCNC: 235 MG/DL
CO2 SERPL-SCNC: 24 MMOL/L
CREAT SERPL-MCNC: 0.61 MG/DL
EGFR: 105 ML/MIN/1.73M2
ESTIMATED AVERAGE GLUCOSE: 126 MG/DL
GLUCOSE SERPL-MCNC: 114 MG/DL
HBA1C MFR BLD HPLC: 6 %
HDLC SERPL-MCNC: 60 MG/DL
LDLC SERPL CALC-MCNC: 152 MG/DL
NONHDLC SERPL-MCNC: 175 MG/DL
POTASSIUM SERPL-SCNC: 4.2 MMOL/L
PROT SERPL-MCNC: 7.2 G/DL
SODIUM SERPL-SCNC: 135 MMOL/L
TRIGL SERPL-MCNC: 131 MG/DL

## 2023-12-11 ENCOUNTER — RX RENEWAL (OUTPATIENT)
Age: 58
End: 2023-12-11

## 2023-12-11 RX ORDER — LEVOTHYROXINE SODIUM 0.15 MG/1
150 TABLET ORAL
Qty: 90 | Refills: 2 | Status: ACTIVE | COMMUNITY
Start: 2023-06-12 | End: 1900-01-01

## 2024-01-05 NOTE — ED PROVIDER NOTE - CPE EDP EYES NORM
Medication: pantoprazole (PROTONIX) 40 MG table   Last office visit date: 3/29/2023  Next appointment scheduled?: No;  pt needs a follow up appt    Number of refills given: 0    Encounter routed to PSR to schedule appt. Rx has an end date of 3/28/2024.     normal...

## 2024-05-06 ENCOUNTER — APPOINTMENT (OUTPATIENT)
Dept: FAMILY MEDICINE | Facility: CLINIC | Age: 59
End: 2024-05-06

## 2024-05-29 ENCOUNTER — APPOINTMENT (OUTPATIENT)
Dept: ENDOCRINOLOGY | Facility: CLINIC | Age: 59
End: 2024-05-29
Payer: COMMERCIAL

## 2024-05-29 VITALS
DIASTOLIC BLOOD PRESSURE: 76 MMHG | HEART RATE: 109 BPM | OXYGEN SATURATION: 97 % | HEIGHT: 58 IN | SYSTOLIC BLOOD PRESSURE: 115 MMHG | BODY MASS INDEX: 30.23 KG/M2 | WEIGHT: 144 LBS

## 2024-05-29 DIAGNOSIS — E78.5 HYPERLIPIDEMIA, UNSPECIFIED: ICD-10-CM

## 2024-05-29 DIAGNOSIS — R73.03 PREDIABETES.: ICD-10-CM

## 2024-05-29 DIAGNOSIS — E26.9 HYPERALDOSTERONISM, UNSPECIFIED: ICD-10-CM

## 2024-05-29 DIAGNOSIS — E04.2 NONTOXIC MULTINODULAR GOITER: ICD-10-CM

## 2024-05-29 DIAGNOSIS — I10 ESSENTIAL (PRIMARY) HYPERTENSION: ICD-10-CM

## 2024-05-29 DIAGNOSIS — E03.9 HYPOTHYROIDISM, UNSPECIFIED: ICD-10-CM

## 2024-05-29 PROCEDURE — 36415 COLL VENOUS BLD VENIPUNCTURE: CPT

## 2024-05-29 PROCEDURE — 99214 OFFICE O/P EST MOD 30 MIN: CPT | Mod: 25

## 2024-05-29 NOTE — HISTORY OF PRESENT ILLNESS
[FreeTextEntry1] : 59 year female  f/u for multiple medical issues  *** May 29, 2024 ***  being treated for a trigeminal neuralgia with hot flashes, sweating, low energy level, losing scalp hair staying on L-thyroxine 150 mcg, but skipped medication for several days last week concerned about her elevated liver enzymes no recent labs last a1c- 6.0%, calcium- 10.9  *** Aug 16, 2023 *** missed f/u visits feeling more tired, constant headaches, trouble sleeping, "brain fog" and feeling anxious, constipation seeing neurologist, reports normal brain MRI. scheduled for botox injections last labs from 5/23- TSH- 7.49 saw cardiologist, who increased  L-thyroxine to 150 mcg about a month ago.  Seeing psychiatrist- on Effexor and Klonopine  *** Televisit  Jun 14, 2022 ***  rescheduled today's visit to a TEB b/o the car issues with a worsening fatigue, hair loss recently, cold intolerance. using CPAP for her MINDY no labs available to me, but per patient was seen by a cardiologist and "TSH was elevated" remains on L-thyroxine 125 mcg  24h meta/UFC- normal  *** Phone visit  Dec 20, 2021 ***  working full time now, missed f/u appts plans to see a rheum for her arthritis. BP is still high- to see cardio at [facility]. still on labetalol, losartan, hydralazine no recent labs still on L-thyroxine 125 mcg.   CT abd (7/20/21)- normal adrenals  *** May 03, 2021 ***  labs from 4/16/21- TSH- 0.1, FT4- 1.8 24h UFC/ johan/meta- not run by the lab CT abd is not approved by insurance- awaiting confirmatory testing still on multiple antihypertensive meds with BP suboptimally controlled  *** Jan 14, 2021 ***  seeing Dr. Francisco for migraines still with fatigue, thinning hair. admitted in December for hypertensive urgency with SBP > 220 on L-thyroxine 137 mcg taking labetalol 200 mg bid, clonidine, hydralazine 50 gm bid. See. cardio at [facility]  *** Oct 12, 2020 ***  on L-thyroxine 137 mcg rpt am cortisol- 10.2  (once off steroids)  prior to that , received 7 injections of triamcinolone Thyroid US (5/26/20)- s/p left hemithyroidectomy. no nodules in the right thyroid  Pit MRI (4/13/20)- no pit masses  HPI: Mrs Farfan was evaluated for a persistent headache lasting for about a month. She had 2 visits to ER and apparently CT scan of the head was negative. She was subsequently seen by Dr. Francisco and MRI of the brain was also negative. She's scheduled for an MRI of the pituitary gland today.   She's been also diagnosed with Hashimoto's thyroiditis and has been on a stable dose of L-thyroxine 150 mcg. She also underwent a partial left hemithyroidectomy about 4-5 years ago for a presumably precancerous lesion. Denies history of neck surgery or radiation exposure to the neck area other than radiologic studies.  Family history is significant for undefined thyroid problems on her mother's side. Currently, there are no local neck symptoms of anterior neck pain or problems with breathing, speaking, or swallowing.  Patient denies symptoms of hypothyroidism or hyperthyroidism.  She's been also treated for uncontrolled HTN with Edarbyclor 40/12.5 and Clonidine prn. She reports an aldosterone checked in the past which was normal, and elevated renin which was attributed to her blood pressure medications. She's seen a nephrologist for that.  She reports a history of breast cancer (P+, E+), s/p chemotherapy but no radiation therapy. She took Tamoxifen for 5 years.  Labs: late pm cortisol 5.7, ACTH- 8.4, TSH-  1.64, FT4- 1.5, LH- 0.4, FSH- 15.1, GH- 0.08 (0.12-9.88) , IGF-1- 53, prolactin- 7.1  MRI brain (3/24/20)- T2 hyperintensities in a pattern seen in association with vasospastic phenomena FNA (10/28/14) of the  LLP 1.0 cm thyroid nodule-  no path report is available Thyroid US (10/9/14)- LMP 0.9x0.5x0.7 echogenic nodule

## 2024-05-29 NOTE — ASSESSMENT
[Levothyroxine] : The patient was instructed to take Levothyroxine on an empty stomach, separate from vitamins, and wait at least 30 minutes before eating [FreeTextEntry1] : 1. H/o abnormal cortisol levels - no  pit masses seen on the last MRI - I've also discussed with the patient, that epidural triamcinolone suppressed HPA axis within a week with gradual recovering over 4-12 wks. She seems to recover completely, and we'll reassess clinically  2. Hypothyroidism - repeat thyroid levels today and adjust medication - cont L-thyroxine 150 mcg , pending labs - consider  adding Cytomel  (R+B reviewed) Proper intake of levothyroxine is reviewed in details, including on an empty stomach, with water only, at least one hour before taking any medications, vitamins, or supplements and three-four hours before taking iron or calcium. - I've advised to obtain the pathology report form her surgery. Will repeat thyroid US PRN - CT adrenals is neg for adenoma. periodically retest PAC/PRA  and consider a trial of aldactone if needed  3. Isolated mild hypercalcemia recheck calcium/PTH, vitamin D  4. Prediabetes Current approaches to a prediabetes management are discussed with the patient. Target ranges for blood sugar, blood pressure and cholesterol reviewed, and risk reduction strategies verified. Hypoglycemia precautions reviewed with the patient. Suggested extensive diabetes education program, including nutritional and diabetes teaching and evaluation. Proper dietary restrictions and exercise routines discussed.  - we discussed starting on Metformin- will start after blood work  RTC 3 months

## 2024-05-29 NOTE — REASON FOR VISIT
[Follow - Up] : a follow-up visit [Adrenal Evaluation/Adrenal Disorder] : adrenal evaluation/adrenal disorder [Hypothyroidism] : hypothyroidism [Pituitary Evaluation/ Disorder] : pituitary evaluation/disorder [Thyroid nodule/ MNG] : thyroid nodule/ MNG [Hypercalcemia] : hypercalcemia

## 2024-05-30 ENCOUNTER — TRANSCRIPTION ENCOUNTER (OUTPATIENT)
Age: 59
End: 2024-05-30

## 2024-05-30 LAB
25(OH)D3 SERPL-MCNC: 51.3 NG/ML
ALBUMIN SERPL ELPH-MCNC: 4.4 G/DL
ALP BLD-CCNC: 109 U/L
ALT SERPL-CCNC: 42 U/L
ANION GAP SERPL CALC-SCNC: 18 MMOL/L
AST SERPL-CCNC: 38 U/L
BILIRUB SERPL-MCNC: 0.2 MG/DL
BUN SERPL-MCNC: 9 MG/DL
C PEPTIDE SERPL-MCNC: 3 NG/ML
CALCIUM SERPL-MCNC: 9.6 MG/DL
CALCIUM SERPL-MCNC: 9.6 MG/DL
CHLORIDE SERPL-SCNC: 99 MMOL/L
CHOLEST SERPL-MCNC: 190 MG/DL
CO2 SERPL-SCNC: 18 MMOL/L
CREAT SERPL-MCNC: 0.59 MG/DL
DEPRECATED KAPPA LC FREE/LAMBDA SER: 1.44 RATIO
EGFR: 105 ML/MIN/1.73M2
ESTIMATED AVERAGE GLUCOSE: 126 MG/DL
FOLATE SERPL-MCNC: 3.9 NG/ML
GGT SERPL-CCNC: 62 U/L
GLUCOSE SERPL-MCNC: 48 MG/DL
HAV IGM SER QL: NONREACTIVE
HBA1C MFR BLD HPLC: 6 %
HBV CORE IGM SER QL: NONREACTIVE
HBV SURFACE AG SER QL: NONREACTIVE
HCV AB SER QL: NONREACTIVE
HCV S/CO RATIO: 0.13 S/CO
HDLC SERPL-MCNC: 44 MG/DL
KAPPA LC CSF-MCNC: 1.76 MG/DL
KAPPA LC SERPL-MCNC: 2.53 MG/DL
LDLC SERPL CALC-MCNC: 109 MG/DL
NONHDLC SERPL-MCNC: 145 MG/DL
PARATHYROID HORMONE INTACT: 40 PG/ML
POTASSIUM SERPL-SCNC: 5 MMOL/L
PROT SERPL-MCNC: 7.4 G/DL
SODIUM SERPL-SCNC: 135 MMOL/L
T3 SERPL-MCNC: 146 NG/DL
T4 FREE SERPL-MCNC: 1.5 NG/DL
TRIGL SERPL-MCNC: 211 MG/DL
TSH SERPL-ACNC: 1.48 UIU/ML
VIT B12 SERPL-MCNC: 935 PG/ML

## 2024-05-30 RX ORDER — LEVOTHYROXINE SODIUM 137 UG/1
137 TABLET ORAL DAILY
Qty: 90 | Refills: 2 | Status: DISCONTINUED | COMMUNITY
Start: 2020-05-01 | End: 2024-05-30

## 2024-05-30 RX ORDER — FOLIC ACID 1 MG/1
1 TABLET ORAL DAILY
Qty: 90 | Refills: 2 | Status: ACTIVE | COMMUNITY
Start: 2024-05-30 | End: 1900-01-01

## 2024-05-31 ENCOUNTER — TRANSCRIPTION ENCOUNTER (OUTPATIENT)
Age: 59
End: 2024-05-31

## 2024-05-31 LAB
CA-I SERPL-SCNC: 5 MG/DL
THYROGLOB AB SERPL-ACNC: <20 IU/ML
THYROPEROXIDASE AB SERPL IA-ACNC: 373 IU/ML

## 2024-06-03 LAB
ALBUMIN MFR SERPL ELPH: 55.4 %
ALBUMIN SERPL-MCNC: 4.1 G/DL
ALBUMIN/GLOB SERPL: 1.2 RATIO
ALPHA1 GLOB MFR SERPL ELPH: 4.3 %
ALPHA1 GLOB SERPL ELPH-MCNC: 0.3 G/DL
ALPHA2 GLOB MFR SERPL ELPH: 11.1 %
ALPHA2 GLOB SERPL ELPH-MCNC: 0.8 G/DL
B-GLOBULIN MFR SERPL ELPH: 12.8 %
B-GLOBULIN SERPL ELPH-MCNC: 0.9 G/DL
GAMMA GLOB FLD ELPH-MCNC: 1.2 G/DL
GAMMA GLOB MFR SERPL ELPH: 16.4 %
INTERPRETATION SERPL IEP-IMP: NORMAL
M PROTEIN SPEC IFE-MCNC: NORMAL
PROT SERPL-MCNC: 7.4 G/DL
PROT SERPL-MCNC: 7.4 G/DL

## 2024-06-10 ENCOUNTER — APPOINTMENT (OUTPATIENT)
Dept: FAMILY MEDICINE | Facility: CLINIC | Age: 59
End: 2024-06-10

## 2024-08-29 ENCOUNTER — APPOINTMENT (OUTPATIENT)
Dept: ENDOCRINOLOGY | Facility: CLINIC | Age: 59
End: 2024-08-29

## 2024-08-30 ENCOUNTER — APPOINTMENT (OUTPATIENT)
Dept: FAMILY MEDICINE | Facility: CLINIC | Age: 59
End: 2024-08-30

## 2024-10-15 ENCOUNTER — APPOINTMENT (OUTPATIENT)
Dept: ENDOCRINOLOGY | Facility: CLINIC | Age: 59
End: 2024-10-15

## 2024-10-31 ENCOUNTER — APPOINTMENT (OUTPATIENT)
Dept: ENDOCRINOLOGY | Facility: CLINIC | Age: 59
End: 2024-10-31

## 2024-11-18 ENCOUNTER — APPOINTMENT (OUTPATIENT)
Dept: FAMILY MEDICINE | Facility: CLINIC | Age: 59
End: 2024-11-18

## 2024-12-27 ENCOUNTER — NON-APPOINTMENT (OUTPATIENT)
Age: 59
End: 2024-12-27

## 2025-01-21 ENCOUNTER — APPOINTMENT (OUTPATIENT)
Dept: FAMILY MEDICINE | Facility: CLINIC | Age: 60
End: 2025-01-21

## 2025-01-27 ENCOUNTER — APPOINTMENT (OUTPATIENT)
Dept: ENDOCRINOLOGY | Facility: CLINIC | Age: 60
End: 2025-01-27
Payer: COMMERCIAL

## 2025-01-27 VITALS
BODY MASS INDEX: 28.97 KG/M2 | RESPIRATION RATE: 16 BRPM | HEIGHT: 58 IN | WEIGHT: 138 LBS | SYSTOLIC BLOOD PRESSURE: 163 MMHG | DIASTOLIC BLOOD PRESSURE: 99 MMHG | HEART RATE: 87 BPM | OXYGEN SATURATION: 98 %

## 2025-01-27 DIAGNOSIS — E78.5 HYPERLIPIDEMIA, UNSPECIFIED: ICD-10-CM

## 2025-01-27 DIAGNOSIS — E06.3 AUTOIMMUNE THYROIDITIS: ICD-10-CM

## 2025-01-27 DIAGNOSIS — E03.9 HYPOTHYROIDISM, UNSPECIFIED: ICD-10-CM

## 2025-01-27 DIAGNOSIS — E23.7 DISORDER OF PITUITARY GLAND, UNSPECIFIED: ICD-10-CM

## 2025-01-27 DIAGNOSIS — I10 ESSENTIAL (PRIMARY) HYPERTENSION: ICD-10-CM

## 2025-01-27 DIAGNOSIS — E04.2 NONTOXIC MULTINODULAR GOITER: ICD-10-CM

## 2025-01-27 DIAGNOSIS — R73.03 PREDIABETES.: ICD-10-CM

## 2025-01-27 PROCEDURE — 99214 OFFICE O/P EST MOD 30 MIN: CPT

## 2025-01-27 PROCEDURE — 36415 COLL VENOUS BLD VENIPUNCTURE: CPT

## 2025-01-29 LAB
25(OH)D3 SERPL-MCNC: 37.9 NG/ML
ALBUMIN SERPL ELPH-MCNC: 4.3 G/DL
ALP BLD-CCNC: 103 U/L
ALT SERPL-CCNC: 18 U/L
ANION GAP SERPL CALC-SCNC: 13 MMOL/L
AST SERPL-CCNC: 21 U/L
BASOPHILS # BLD AUTO: 0.03 K/UL
BASOPHILS NFR BLD AUTO: 0.4 %
BILIRUB SERPL-MCNC: <0.2 MG/DL
BUN SERPL-MCNC: 14 MG/DL
CALCIUM SERPL-MCNC: 9.2 MG/DL
CHLORIDE SERPL-SCNC: 103 MMOL/L
CHOLEST SERPL-MCNC: 248 MG/DL
CO2 SERPL-SCNC: 20 MMOL/L
CREAT SERPL-MCNC: 0.51 MG/DL
DEPRECATED KAPPA LC FREE/LAMBDA SER: 1.42 RATIO
EGFR: 109 ML/MIN/1.73M2
EOSINOPHIL # BLD AUTO: 0.28 K/UL
EOSINOPHIL NFR BLD AUTO: 4.1 %
ESTIMATED AVERAGE GLUCOSE: 117 MG/DL
FOLATE SERPL-MCNC: 15.2 NG/ML
GLUCOSE SERPL-MCNC: 91 MG/DL
HBA1C MFR BLD HPLC: 5.7 %
HCT VFR BLD CALC: 35.3 %
HDLC SERPL-MCNC: 40 MG/DL
HGB BLD-MCNC: 10.8 G/DL
IMM GRANULOCYTES NFR BLD AUTO: 0.4 %
KAPPA LC CSF-MCNC: 1.39 MG/DL
KAPPA LC SERPL-MCNC: 1.98 MG/DL
LDLC SERPL CALC-MCNC: 151 MG/DL
LYMPHOCYTES # BLD AUTO: 2.36 K/UL
LYMPHOCYTES NFR BLD AUTO: 34.4 %
MAN DIFF?: NORMAL
MCHC RBC-ENTMCNC: 26.2 PG
MCHC RBC-ENTMCNC: 30.6 G/DL
MCV RBC AUTO: 85.7 FL
MONOCYTES # BLD AUTO: 0.47 K/UL
MONOCYTES NFR BLD AUTO: 6.8 %
NEUTROPHILS # BLD AUTO: 3.7 K/UL
NEUTROPHILS NFR BLD AUTO: 53.9 %
NONHDLC SERPL-MCNC: 208 MG/DL
PLATELET # BLD AUTO: 351 K/UL
POTASSIUM SERPL-SCNC: 4.8 MMOL/L
PROT SERPL-MCNC: 7.4 G/DL
RBC # BLD: 4.12 M/UL
RBC # FLD: 13.2 %
SODIUM SERPL-SCNC: 137 MMOL/L
T4 FREE SERPL-MCNC: 1.2 NG/DL
TRIGL SERPL-MCNC: 310 MG/DL
TSH SERPL-ACNC: 1.74 UIU/ML
VIT B12 SERPL-MCNC: 726 PG/ML
WBC # FLD AUTO: 6.87 K/UL

## 2025-01-31 LAB
ALBUMIN MFR SERPL ELPH: 59.9 %
ALBUMIN SERPL-MCNC: 4.4 G/DL
ALBUMIN/GLOB SERPL: 1.5 RATIO
ALPHA1 GLOB MFR SERPL ELPH: 3.9 %
ALPHA1 GLOB SERPL ELPH-MCNC: 0.3 G/DL
ALPHA2 GLOB MFR SERPL ELPH: 10.3 %
ALPHA2 GLOB SERPL ELPH-MCNC: 0.8 G/DL
B-GLOBULIN MFR SERPL ELPH: 11.5 %
B-GLOBULIN SERPL ELPH-MCNC: 0.9 G/DL
GAMMA GLOB FLD ELPH-MCNC: 1.1 G/DL
GAMMA GLOB MFR SERPL ELPH: 14.4 %
INTERPRETATION SERPL IEP-IMP: NORMAL
M PROTEIN SPEC IFE-MCNC: NORMAL
PROT SERPL-MCNC: 7.4 G/DL
PROT SERPL-MCNC: 7.4 G/DL

## 2025-02-19 ENCOUNTER — APPOINTMENT (OUTPATIENT)
Dept: FAMILY MEDICINE | Facility: CLINIC | Age: 60
End: 2025-02-19
Payer: COMMERCIAL

## 2025-02-19 VITALS
RESPIRATION RATE: 16 BRPM | DIASTOLIC BLOOD PRESSURE: 70 MMHG | TEMPERATURE: 96.6 F | WEIGHT: 141 LBS | BODY MASS INDEX: 29.47 KG/M2 | SYSTOLIC BLOOD PRESSURE: 100 MMHG | OXYGEN SATURATION: 97 % | HEART RATE: 87 BPM

## 2025-02-19 DIAGNOSIS — E06.3 AUTOIMMUNE THYROIDITIS: ICD-10-CM

## 2025-02-19 DIAGNOSIS — E66.9 OBESITY, UNSPECIFIED: ICD-10-CM

## 2025-02-19 DIAGNOSIS — I10 ESSENTIAL (PRIMARY) HYPERTENSION: ICD-10-CM

## 2025-02-19 DIAGNOSIS — F11.20 OPIOID DEPENDENCE, UNCOMPLICATED: ICD-10-CM

## 2025-02-19 DIAGNOSIS — M26.609 UNSPECIFIED TEMPOROMANDIBULAR JOINT DISORDER: ICD-10-CM

## 2025-02-19 DIAGNOSIS — G43.909 MIGRAINE, UNSPECIFIED, NOT INTRACTABLE, W/OUT STATUS MIGRAINOSUS: ICD-10-CM

## 2025-02-19 DIAGNOSIS — R73.03 PREDIABETES.: ICD-10-CM

## 2025-02-19 DIAGNOSIS — Z00.00 ENCOUNTER FOR GENERAL ADULT MEDICAL EXAMINATION W/OUT ABNORMAL FINDINGS: ICD-10-CM

## 2025-02-19 DIAGNOSIS — G47.30 SLEEP APNEA, UNSPECIFIED: ICD-10-CM

## 2025-02-19 DIAGNOSIS — R41.3 OTHER AMNESIA: ICD-10-CM

## 2025-02-19 DIAGNOSIS — E03.9 HYPOTHYROIDISM, UNSPECIFIED: ICD-10-CM

## 2025-02-19 DIAGNOSIS — F41.8 OTHER SPECIFIED ANXIETY DISORDERS: ICD-10-CM

## 2025-02-19 DIAGNOSIS — E78.5 HYPERLIPIDEMIA, UNSPECIFIED: ICD-10-CM

## 2025-02-19 DIAGNOSIS — D64.9 ANEMIA, UNSPECIFIED: ICD-10-CM

## 2025-02-19 PROCEDURE — 99396 PREV VISIT EST AGE 40-64: CPT

## 2025-02-19 PROCEDURE — 36415 COLL VENOUS BLD VENIPUNCTURE: CPT

## 2025-02-19 RX ORDER — TOPIRAMATE 25 MG/1
25 TABLET, FILM COATED ORAL TWICE DAILY
Refills: 0 | Status: ACTIVE | COMMUNITY
Start: 2025-02-19

## 2025-02-19 RX ORDER — BUPRENORPHINE HYDROCHLORIDE, NALOXONE HYDROCHLORIDE 4; 1 MG/1; MG/1
4-1 FILM, SOLUBLE BUCCAL; SUBLINGUAL
Refills: 0 | Status: ACTIVE | COMMUNITY
Start: 2025-02-19

## 2025-02-24 RX ORDER — SEMAGLUTIDE 0.68 MG/ML
2 INJECTION, SOLUTION SUBCUTANEOUS
Qty: 1 | Refills: 1 | Status: ACTIVE | COMMUNITY
Start: 2025-02-19

## 2025-03-10 ENCOUNTER — APPOINTMENT (OUTPATIENT)
Dept: FAMILY MEDICINE | Facility: CLINIC | Age: 60
End: 2025-03-10

## 2025-03-10 DIAGNOSIS — T78.2XXA ANAPHYLACTIC SHOCK, UNSPECIFIED, INITIAL ENCOUNTER: ICD-10-CM

## 2025-03-10 RX ORDER — EPINEPHRINE 0.3 MG/.3ML
0.3 INJECTION INTRAMUSCULAR
Qty: 1 | Refills: 4 | Status: ACTIVE | COMMUNITY
Start: 2025-03-10 | End: 1900-01-01

## 2025-04-16 RX ORDER — TIRZEPATIDE 2.5 MG/.5ML
2.5 INJECTION, SOLUTION SUBCUTANEOUS
Qty: 2 | Refills: 0 | Status: ACTIVE | COMMUNITY
Start: 2025-04-15

## 2025-05-01 ENCOUNTER — APPOINTMENT (OUTPATIENT)
Dept: FAMILY MEDICINE | Facility: CLINIC | Age: 60
End: 2025-05-01

## 2025-05-12 ENCOUNTER — APPOINTMENT (OUTPATIENT)
Dept: FAMILY MEDICINE | Facility: CLINIC | Age: 60
End: 2025-05-12
Payer: COMMERCIAL

## 2025-05-12 DIAGNOSIS — R73.03 PREDIABETES.: ICD-10-CM

## 2025-05-12 DIAGNOSIS — E66.9 OBESITY, UNSPECIFIED: ICD-10-CM

## 2025-05-12 PROCEDURE — 99213 OFFICE O/P EST LOW 20 MIN: CPT | Mod: 95

## 2025-05-14 DIAGNOSIS — R11.0 NAUSEA: ICD-10-CM

## 2025-05-16 RX ORDER — ONDANSETRON 4 MG/1
4 TABLET, ORALLY DISINTEGRATING ORAL
Qty: 21 | Refills: 0 | Status: ACTIVE | COMMUNITY
Start: 2025-05-14

## 2025-05-19 RX ORDER — TIRZEPATIDE 2.5 MG/.5ML
2.5 INJECTION, SOLUTION SUBCUTANEOUS
Qty: 2 | Refills: 0 | Status: ACTIVE | COMMUNITY
Start: 2025-05-19 | End: 1900-01-01

## 2025-06-10 ENCOUNTER — RX RENEWAL (OUTPATIENT)
Age: 60
End: 2025-06-10

## 2025-07-02 ENCOUNTER — APPOINTMENT (OUTPATIENT)
Dept: FAMILY MEDICINE | Facility: CLINIC | Age: 60
End: 2025-07-02

## 2025-08-21 ENCOUNTER — APPOINTMENT (OUTPATIENT)
Dept: ORTHOPEDIC SURGERY | Facility: CLINIC | Age: 60
End: 2025-08-21

## 2025-08-29 ENCOUNTER — APPOINTMENT (OUTPATIENT)
Dept: ORTHOPEDIC SURGERY | Facility: CLINIC | Age: 60
End: 2025-08-29